# Patient Record
Sex: MALE | Race: WHITE | Employment: OTHER | URBAN - METROPOLITAN AREA
[De-identification: names, ages, dates, MRNs, and addresses within clinical notes are randomized per-mention and may not be internally consistent; named-entity substitution may affect disease eponyms.]

---

## 2017-11-01 RX ORDER — SIMVASTATIN 40 MG
TABLET ORAL
Qty: 30 TABLET | Refills: 0 | Status: SHIPPED | OUTPATIENT
Start: 2017-11-01 | End: 2017-12-01 | Stop reason: SDUPTHER

## 2017-11-09 RX ORDER — LISINOPRIL 5 MG/1
TABLET ORAL
Qty: 90 TABLET | Refills: 3 | Status: SHIPPED | OUTPATIENT
Start: 2017-11-09 | End: 2018-11-26 | Stop reason: SDUPTHER

## 2017-12-01 RX ORDER — SIMVASTATIN 40 MG
TABLET ORAL
Qty: 30 TABLET | Refills: 5 | Status: SHIPPED | OUTPATIENT
Start: 2017-12-01 | End: 2018-06-06 | Stop reason: SDUPTHER

## 2018-01-18 ENCOUNTER — OFFICE VISIT (OUTPATIENT)
Dept: CARDIOLOGY CLINIC | Age: 68
End: 2018-01-18

## 2018-01-18 VITALS
HEART RATE: 86 BPM | OXYGEN SATURATION: 98 % | SYSTOLIC BLOOD PRESSURE: 130 MMHG | BODY MASS INDEX: 40.84 KG/M2 | HEIGHT: 72 IN | DIASTOLIC BLOOD PRESSURE: 86 MMHG | WEIGHT: 301.5 LBS

## 2018-01-18 DIAGNOSIS — E78.00 PURE HYPERCHOLESTEROLEMIA: ICD-10-CM

## 2018-01-18 DIAGNOSIS — I10 ESSENTIAL HYPERTENSION: ICD-10-CM

## 2018-01-18 DIAGNOSIS — I25.10 CORONARY ARTERY DISEASE INVOLVING NATIVE CORONARY ARTERY OF NATIVE HEART WITHOUT ANGINA PECTORIS: Primary | ICD-10-CM

## 2018-01-18 PROCEDURE — 99214 OFFICE O/P EST MOD 30 MIN: CPT | Performed by: INTERNAL MEDICINE

## 2018-01-18 PROCEDURE — 1123F ACP DISCUSS/DSCN MKR DOCD: CPT | Performed by: INTERNAL MEDICINE

## 2018-01-18 PROCEDURE — G8484 FLU IMMUNIZE NO ADMIN: HCPCS | Performed by: INTERNAL MEDICINE

## 2018-01-18 PROCEDURE — G8417 CALC BMI ABV UP PARAM F/U: HCPCS | Performed by: INTERNAL MEDICINE

## 2018-01-18 PROCEDURE — 4040F PNEUMOC VAC/ADMIN/RCVD: CPT | Performed by: INTERNAL MEDICINE

## 2018-01-18 PROCEDURE — 1036F TOBACCO NON-USER: CPT | Performed by: INTERNAL MEDICINE

## 2018-01-18 PROCEDURE — 3017F COLORECTAL CA SCREEN DOC REV: CPT | Performed by: INTERNAL MEDICINE

## 2018-01-18 PROCEDURE — G8598 ASA/ANTIPLAT THER USED: HCPCS | Performed by: INTERNAL MEDICINE

## 2018-01-18 PROCEDURE — G8427 DOCREV CUR MEDS BY ELIG CLIN: HCPCS | Performed by: INTERNAL MEDICINE

## 2018-01-18 ASSESSMENT — ENCOUNTER SYMPTOMS
SHORTNESS OF BREATH: 0
COUGH: 0
BLOOD IN STOOL: 0
EYE REDNESS: 0
ABDOMINAL DISTENTION: 0
WHEEZING: 0

## 2018-01-18 NOTE — PATIENT INSTRUCTIONS
Patient Education        Learning About Coronary Artery Disease (CAD)  What is coronary artery disease? Coronary artery disease (CAD) occurs when plaque builds up in the arteries that bring oxygen-rich blood to your heart. Plaque is a fatty substance made of cholesterol, calcium, and other substances in the blood. This process is called hardening of the arteries, or atherosclerosis. What happens when you have coronary artery disease? · Plaque may narrow the coronary arteries. Narrowed arteries cause poor blood flow. This can lead to angina symptoms such as chest pain or discomfort. If blood flow is completely blocked, you could have a heart attack. · You can slow CAD and reduce the risk of future problems by making changes in your lifestyle. These include quitting smoking and eating heart-healthy foods. · Treatments for CAD, along with changes in your lifestyle, can help you live a longer and healthier life. How can you prevent coronary artery disease? · Do not smoke. It may be the best thing you can do to prevent heart disease. If you need help quitting, talk to your doctor about stop-smoking programs and medicines. These can increase your chances of quitting for good. · Be active. Get at least 30 minutes of exercise on most days of the week. Walking is a good choice. You also may want to do other activities, such as running, swimming, cycling, or playing tennis or team sports. · Eat heart-healthy foods. Eat more fruits and vegetables and less foods that contain saturated and trans fats. Limit alcohol, sodium, and sweets. · Stay at a healthy weight. Lose weight if you need to. · Manage other health problems such as diabetes, high blood pressure, and high cholesterol. · Manage stress. Stress can hurt your heart. To keep stress low, talk about your problems and feelings. Don't keep your feelings hidden. · If you have talked about it with your doctor, take a low-dose aspirin every day.  Aspirin can help certain people lower their risk of a heart attack or stroke. But taking aspirin isn't right for everyone, because it can cause serious bleeding. Do not start taking daily aspirin unless your doctor knows about it. How is coronary artery disease treated? · Your doctor will suggest that you make lifestyle changes. For example, your doctor may ask you to eat healthy foods, quit smoking, lose extra weight, and be more active. · You will have to take medicines. · Your doctor may suggest a procedure to open narrowed or blocked arteries. This is called angioplasty. Or your doctor may suggest using healthy blood vessels to create detours around narrowed or blocked arteries. This is called bypass surgery. Follow-up care is a key part of your treatment and safety. Be sure to make and go to all appointments, and call your doctor if you are having problems. It's also a good idea to know your test results and keep a list of the medicines you take. Where can you learn more? Go to https://Finco.Sh[employer] Easy. org and sign in to your [employer] account. Enter ([phone] [phone] in the [employer] box to learn more about \"Learning About Coronary Artery Disease (CAD). \"     If you do not have an account, please click on the \"Sign Up Now\" link. Current as of: September 21, 2016  Content Version: 11.5  © 4896-0456 Healthwise, Incorporated. Care instructions adapted under license by [facility] ([facility]). If you have questions about a medical condition or this instruction, always ask your healthcare professional. [place] [address] any warranty or liability for your use of this information.        Patient Education        Patient Education

## 2018-06-06 RX ORDER — SIMVASTATIN 40 MG
TABLET ORAL
Qty: 30 TABLET | Refills: 5 | Status: SHIPPED | OUTPATIENT
Start: 2018-06-06 | End: 2018-11-26 | Stop reason: SDUPTHER

## 2018-11-26 RX ORDER — LISINOPRIL 5 MG/1
TABLET ORAL
Qty: 90 TABLET | Refills: 3 | Status: SHIPPED | OUTPATIENT
Start: 2018-11-26 | End: 2020-05-04 | Stop reason: ALTCHOICE

## 2018-11-26 RX ORDER — SIMVASTATIN 40 MG
TABLET ORAL
Qty: 90 TABLET | Refills: 3 | Status: SHIPPED | OUTPATIENT
Start: 2018-11-26 | End: 2019-12-16 | Stop reason: SDUPTHER

## 2019-12-16 RX ORDER — SIMVASTATIN 40 MG
TABLET ORAL
Qty: 30 TABLET | Refills: 0 | Status: SHIPPED | OUTPATIENT
Start: 2019-12-16 | End: 2020-01-23 | Stop reason: SDUPTHER

## 2020-01-23 RX ORDER — SIMVASTATIN 40 MG
TABLET ORAL
Qty: 30 TABLET | Refills: 0 | Status: SHIPPED | OUTPATIENT
Start: 2020-01-23 | End: 2020-02-03 | Stop reason: SDUPTHER

## 2020-02-03 RX ORDER — SIMVASTATIN 40 MG
TABLET ORAL
Qty: 90 TABLET | Refills: 3 | Status: SHIPPED | OUTPATIENT
Start: 2020-02-03

## 2020-05-04 RX ORDER — TAMSULOSIN HYDROCHLORIDE 0.4 MG/1
0.4 CAPSULE ORAL DAILY
COMMUNITY

## 2020-05-04 RX ORDER — LISINOPRIL AND HYDROCHLOROTHIAZIDE 20; 12.5 MG/1; MG/1
0.5 TABLET ORAL 2 TIMES DAILY
COMMUNITY

## 2020-05-05 ENCOUNTER — OFFICE VISIT (OUTPATIENT)
Dept: CARDIOLOGY CLINIC | Age: 70
End: 2020-05-05
Payer: MEDICARE

## 2020-05-05 VITALS
HEIGHT: 72 IN | WEIGHT: 309 LBS | OXYGEN SATURATION: 97 % | HEART RATE: 62 BPM | SYSTOLIC BLOOD PRESSURE: 128 MMHG | BODY MASS INDEX: 41.85 KG/M2 | TEMPERATURE: 97.9 F | DIASTOLIC BLOOD PRESSURE: 82 MMHG

## 2020-05-05 PROCEDURE — 93000 ELECTROCARDIOGRAM COMPLETE: CPT | Performed by: INTERNAL MEDICINE

## 2020-05-05 PROCEDURE — 99214 OFFICE O/P EST MOD 30 MIN: CPT | Performed by: INTERNAL MEDICINE

## 2020-05-05 ASSESSMENT — ENCOUNTER SYMPTOMS
EYE REDNESS: 0
WHEEZING: 0
COUGH: 0
SHORTNESS OF BREATH: 1
BLOOD IN STOOL: 0
ABDOMINAL DISTENTION: 0

## 2020-05-05 NOTE — PROGRESS NOTES
Cardiology Follow up    Reason for visit: Follow up for management of CAD       Chief Complaint   Patient presents with    1 Year Follow Up     CAD, HTN, HLD, COPD    Shortness of Breath     thinks due to his weight       HPI: Arley Salas is a 81 y.o. male with a past medical history significant for CAD s/p CABG X 3, hypertension and hyperlipidemia. Interval History: Today, he presents to office for follow up for management of CAD. He reports feeling short of breath which he attributes to his weight but states he has being trying to loose weight and recently lost 20 lbs. He state the shortness of breath comes and goes. He reports the he followed up with sleep study and has a CPAP but is not wearing daily but plans to restart. He is compliant with his medications and tolerating them well. He denies chest pain/pressure, tightness, edema, heart racing, palpitations, lightheadedness, dizziness, syncope, presyncope,  PND or orthopnea. Current Outpatient Medications   Medication Sig Dispense Refill    lisinopril-hydroCHLOROthiazide (PRINZIDE;ZESTORETIC) 20-12.5 MG per tablet Take 0.5 tablets by mouth 2 times daily      tamsulosin (FLOMAX) 0.4 MG capsule Take 0.4 mg by mouth daily      Esomeprazole Magnesium (NEXIUM PO) Take by mouth daily      Naproxen Sodium (ALEVE PO) Take by mouth as needed      metoprolol tartrate (LOPRESSOR) 25 MG tablet TAKE 1/2 TABLET BY MOUTH TWICE A  tablet 3    simvastatin (ZOCOR) 40 MG tablet TAKE 1 TABLET BY MOUTH IN THE EVENING 90 tablet 3    aspirin 325 MG tablet Take 650 mg by mouth daily. In hospital medication       No current facility-administered medications for this visit.       Social History     Socioeconomic History    Marital status:      Spouse name: None    Number of children: None    Years of education: None    Highest education level: None   Occupational History    None   Social Needs    Financial resource strain: None   Taylor-[place]arden[place] insecurity     Worry: None     Inability: None    Transportation needs     Medical: None     Non-medical: None   Tobacco Use    Smoking status: Former Smoker     Packs/day: 2.00     Years: 25.00     Pack years: 50.00     Last attempt to quit: 2012     Years since quittin.3    Smokeless tobacco: Never Used   Substance and Sexual Activity    Alcohol use: No     Comment: Former ETOH Abuse Quit 30 years ago    Drug use: No    Sexual activity: None   Lifestyle    Physical activity     Days per week: None     Minutes per session: None    Stress: None   Relationships    Social connections     Talks on phone: None     Gets together: None     Attends Religion service: None     Active member of club or organization: None     Attends meetings of clubs or organizations: None     Relationship status: None    Intimate partner violence     Fear of current or ex partner: None     Emotionally abused: None     Physically abused: None     Forced sexual activity: None   Other Topics Concern    None   Social History Narrative    None     Past Surgical History:   Procedure Laterality Date    CORONARY ARTERY BYPASS GRAFT  2012    KNEE SURGERY       No Known Allergies  Family History   Problem Relation Age of Onset    Heart Disease Father     High Blood Pressure Father     High Cholesterol Father     Dementia Mother     Heart Disease Paternal Grandfather     Anesth Problems Neg Hx     Malig Hyperten Neg Hx     Hypotension Neg Hx     Malig Hypertherm Neg Hx     Pseudochol. Deficiency Neg Hx        Review of Systems   Constitutional: Negative for activity change, appetite change, chills, fatigue, fever and unexpected weight change. HENT: Negative for congestion, nosebleeds and tinnitus. Eyes: Negative for redness and visual disturbance. Respiratory: Positive for shortness of breath. Negative for cough and wheezing. Cardiovascular: Negative for chest pain, palpitations and leg swelling.

## 2022-08-26 PROBLEM — I48.0 PAF (PAROXYSMAL ATRIAL FIBRILLATION) (HCC): Status: ACTIVE | Noted: 2022-08-26

## 2022-08-26 NOTE — PROGRESS NOTES
[facility]   Electrophysiology Consultation   Date: 8/29/2022  Reason for Consultation: PAF   Consult Requesting Physician: Soo Hansen   Chief Complaint   Patient presents with    New Patient    Atrial Fibrillation    Hypertension         CC: Afib   HPI: Iggy Villanueva is a 73 y.o. male with a PMH of CAD, HTN, HLD, COPD, NASIMA and atrial fibrillation    8/9/2022 he was seen by  at [facility] for 6 month follow up. He had complaints of shortness of breath and fatigue. Advised to complete holter and stress test and referred to EP for treatment options. Ashley Han presents to the office as a new patient. He states he has been in afib for about the past year. He follows Gop[facility] Allen but does not prefer to be seen at [facility]. He is tired all the time, occasionally can fall asleep sitting up. He has a cpap at home but does not use it as he was unable to tolerate it in the past. He feels his heart racing at times. Denies passing out or blacking out. He works physically and is about to build an addition on his home. He has noticed a decrease in stamina recently and increase in LOZADA. Assessment and plan:   -Persistent Atrial Fibrillation (per pt has been out of rhythm since last year)   ECG today shows Afib   Patient has a [id number] Score of at least 3 (HTN, Age)CAD) on eliquis 5 mg BID    Continue Toprol XL 50 mg daily and Diltiazem 120 mg daily for rate control  We discussed treatment options including antiarrhythmics, rate control with anticoagulation, and ablation. We discussed progressive nature of atrial fibrillation. Treatment success decreases when AF becomes persistent and last more than 6 months. Antiarrhythmic therapy, side effects, benefits and alternative discussed. He is not interested in antiarrhythmic therapy with amiodarone, etc.      Atrial fibrillation ablation procedure was discussed. We discussed the need for repeat procedure.  On average patients may need more than one ablation procedure. Risks associated with ablation include but not limited to allergic reaction to the medications, pain, bleeding, infection, nerve injury, injury to diaphragm(breathing muscle), pulmonary embolus(blood clot in lungs), deep vein blood clot, pneumothorax, hemothorax, acute renal failure, cardiac perforation,  tamponade, need for emergent surgery (open heart), permanent pacemaker, pulmonary vein stenosis, left atrial to esophageal fistula, stroke, myocardial infarction and death. Difference between atrial fibrillation and atrial flutter discussed and treatment discussed. Schedule ablation of atrial fibrillation    -CAD   S/P CABG X3 1/17/12 LIMA-LAD, SVG-OM2 and PDA/LCx,   Managed by     On BB, ASA and Zocor    -HTN  Controlled: 115/80  BP goal <130/80  Home BP monitoring encouraged, printed information provided on how to accurately measure BP at home. Counseled to follow a low salt diet to assure blood pressure remains controlled for cardiovascular risk factor modification. The patient is counseled to get regular exercise 3-5 times per week and maintain a healthy weight reduce cardiovascular risk factors. -NASIMA  Does not use his CPAP, encouraged him to use the machine   Encourage to use machine to prevent long term effects of untreated NASIMA    -Obesity  Body mass index is 41.45 kg/m². Excessive weight is complicating assessment and treatment. It is placing patient at risk for multiple co-morbidities as well as early death and contributing to the patient's presentation.   Discussed weight management with diet and exercise        Patient Active Problem List    Diagnosis Date Noted    PAF [facility]oxysmal [facility] fibrillation[facility] 08/26/2022    CAD (coronary artery disease) 01/11/2012    HTN (hypertension) 01/11/2012    HLD (hyperlipidemia) 01/11/2012    Asthma 01/11/2012    Obesity 01/11/2012     Diagnostic studies:   ECG 8/29/22  Atrial fibrillation     Echo 9/22/2021  CONCLUSIONS The left ventricle is of normal size with no segmental wall motion abnormalities. Left ventricular function is normal.   The ejection fraction is 55-60%. Structurally normal tricuspid valve without significant stenosis. There is mild-moderate tricuspid regurgitation. Pulmonary artery systolic pressure is normal.  Structurally normal mitral valve without significant stenosis or prolapse. There is mild mitral regurgitation. I independently reviewed the cardiac diagnostic studies, ECG and relevant imaging studies. No results found for: LVEF, LVEFMODE  No results found for: TSHFT4, TSH    Physical Examination:  Vitals:    08/29/22 1358   BP: 115/80   Pulse: 79   SpO2: 96%      Wt Readings from Last 3 Encounters:   08/29/22 (!) 305 lb 9.6 oz (138.6 kg)   05/04/20 (!) 309 lb (140.2 kg)   01/18/18 (!) 301 lb 8 oz (136.8 kg)       Constitutional: Oriented. No distress. Head: Normocephalic and atraumatic. Mouth/Throat: Oropharynx is clear and moist.   Eyes: Conjunctivae normal. EOM are normal.   Neck: Neck supple. No JVD present. Cardiovascular: Normal rate, Irregular rhythm, S1&S2. Pulmonary/Chest: Bilateral respiratory sounds. No rhonchi. Abdominal: Soft. No tenderness. + obese   Musculoskeletal: No tenderness. No edema    Lymphadenopathy: Has no cervical adenopathy. Neurological: Alert and oriented. Follows command, No Gross deficit   Skin: Skin is warm, No rash noted. Psychiatric: Has a normal behavior        Review of System:  [x] Full ROS obtained and negative except as mentioned in HPI    Prior to Admission medications    Medication Sig Start Date End Date Taking?  Authorizing Provider   lisinopril-hydroCHLOROthiazide (PRINZIDE;ZESTORETIC) 20-12.5 MG per tablet Take 0.5 tablets by mouth 2 times daily   Yes Historical Provider, MD   tamsulosin (FLOMAX) 0.4 MG capsule Take 0.4 mg by mouth daily   Yes Historical Provider, MD   Esomeprazole Magnesium (NEXIUM PO) Take by mouth daily   Yes Historical Provider, MD   Naproxen Sodium (ALEVE PO) Take by mouth as needed   Yes Historical Provider, MD   metoprolol tartrate (LOPRESSOR) 25 MG tablet TAKE 1/2 TABLET BY MOUTH TWICE A DAY 3/11/20  Yes Keely Brumfield MD   simvastatin (ZOCOR) 40 MG tablet TAKE 1 TABLET BY MOUTH IN THE EVENING 2/3/20  Yes Keely Brumfield MD   aspirin 325 MG tablet Take 325 mg by mouth daily In hospital medication   Yes Historical Provider, MD       Past Medical History:   Diagnosis Date    CAD (coronary artery disease)     S/P CABG X3  1/17/12 LIMA-LAD, SVG-OM2 and PDA/LCx, LVEF 50-55%    COPD (chronic obstructive pulmonary disease) ([facility]     managed by PCP    Hyperlipidemia     rec semi annual lipids with LDL goal <70    Hypertension     controlled    Tobacco user     cessation discussed        Past Surgical History:   Procedure Laterality Date    CORONARY ARTERY BYPASS GRAFT  1/11/2012    KNEE SURGERY         No Known Allergies    Social History:  Reviewed. reports that he quit smoking about 10 years ago. He has a 50.00 pack-year smoking history. He has never used smokeless tobacco. He reports that he does not drink alcohol and does not use drugs. Family History:  Reviewed. Reviewed. No family history of SCD. Relevant and available labs, and cardiovascular diagnostics reviewed. Reviewed. I independently reviewed all cardiac tracing. I independently reviewed relevant and available cardiac diagnostic tests ECG, CXR, Echo, Stress test, Device interrogation, Holter, CT scan. Outside medical records via Care everywhere reviewed and summarized in H&P above.    Complex medical condition with multiple medical problems affecting prognosis and outcome of EP interventions       - The patient is counseled to follow a low salt diet to assure blood pressure remains controlled for cardiovascular risk factor modification.   - The patient is counseled to avoid excess caffeine, and energy drinks as this may exacerbated ectopy and arrhythmia. - The patient is counseled to get regular exercise 3-5 times per week to control cardiovascular risk factors. - The patient is counseled to lose weigt to control cardiovascular risk factors. All questions and concerns were addressed to the patient/family. Alternatives to my treatment were discussed. I have discussed the above stated plan and the patient verbalized understanding and agreed with the plan. Scribe attestation: This note was scribed in the presence of Pérez Reed MD by Karen Roper RN    Physician Attestation: I, Dr. Pérez Reed, confirm that the scribe's documentation has been prepared under my direction and personally reviewed by me in its entirety. I also confirm that the note above accurately reflects all work, treatment, procedures, and medical decision making performed by me. NOTE: This report was transcribed using voice recognition software. Every effort was made to ensure accuracy, however, inadvertent computerized transcription errors may be present.      Pérez Reed MD, MPH  [facility]   Office: [phone]  Fax: [phone]

## 2022-08-29 ENCOUNTER — OFFICE VISIT (OUTPATIENT)
Dept: CARDIOLOGY CLINIC | Age: 72
End: 2022-08-29
Payer: MEDICARE

## 2022-08-29 VITALS
WEIGHT: 305.6 LBS | SYSTOLIC BLOOD PRESSURE: 115 MMHG | DIASTOLIC BLOOD PRESSURE: 80 MMHG | HEIGHT: 72 IN | HEART RATE: 79 BPM | OXYGEN SATURATION: 96 % | BODY MASS INDEX: 41.39 KG/M2

## 2022-08-29 DIAGNOSIS — I25.810 CORONARY ARTERY DISEASE INVOLVING CORONARY BYPASS GRAFT OF NATIVE HEART WITHOUT ANGINA PECTORIS: ICD-10-CM

## 2022-08-29 DIAGNOSIS — I10 PRIMARY HYPERTENSION: ICD-10-CM

## 2022-08-29 DIAGNOSIS — I48.0 PAF (PAROXYSMAL ATRIAL FIBRILLATION) (HCC): Primary | ICD-10-CM

## 2022-08-29 DIAGNOSIS — E66.01 CLASS 3 SEVERE OBESITY DUE TO EXCESS CALORIES WITHOUT SERIOUS COMORBIDITY IN ADULT, UNSPECIFIED BMI (HCC): ICD-10-CM

## 2022-08-29 PROCEDURE — G8417 CALC BMI ABV UP PARAM F/U: HCPCS | Performed by: INTERNAL MEDICINE

## 2022-08-29 PROCEDURE — 3017F COLORECTAL CA SCREEN DOC REV: CPT | Performed by: INTERNAL MEDICINE

## 2022-08-29 PROCEDURE — 93000 ELECTROCARDIOGRAM COMPLETE: CPT | Performed by: INTERNAL MEDICINE

## 2022-08-29 PROCEDURE — 99205 OFFICE O/P NEW HI 60 MIN: CPT | Performed by: INTERNAL MEDICINE

## 2022-08-29 PROCEDURE — 4004F PT TOBACCO SCREEN RCVD TLK: CPT | Performed by: INTERNAL MEDICINE

## 2022-08-29 PROCEDURE — G8427 DOCREV CUR MEDS BY ELIG CLIN: HCPCS | Performed by: INTERNAL MEDICINE

## 2022-08-29 PROCEDURE — 1123F ACP DISCUSS/DSCN MKR DOCD: CPT | Performed by: INTERNAL MEDICINE

## 2022-08-29 NOTE — PATIENT INSTRUCTIONS
ATRIAL FIB ABLATION INFORMATION    Our  will be contacting you within the next week to schedule     The morning of your ablation you will need to check in at the registration desk in the main lobby. PRE-PROCEDURE INSTRUCTIONS -   -You will be called with a time to arrive for you ablation.  -Do not eat or drink anything after midnight the night before your ablation.  -You may take all of your other medications with a sip of water.  -You will need to have a responsible adult to drive you home. -CVU will provide you with discharge instructions.  -You will need to hold your Eliquis for the day before and morning of     You will be scheduled for a 6-8 week follow up with cardiology. This will be done prior to your discharge instructions. If you have any questions regarding the procedure itself or medications, please call [phone] and ask to speak to an EP nurse.

## 2022-08-29 NOTE — LETTER
[address]  EP Procedure Sheet    8/29/22  Deanne Bryce  1950  EP Procedures  [] Pacemaker implant (single/dual) [] EP Study   [] ICD implant (single/dual) [] Atrial flutter ablation ([facility]NA Y/N)   [] Biv implant ICD [] Tilt Table   [] Biv implant PPM [x] Atrial fibrillation ablation ([facility] Yes)   [] Generator Change (PPM/ICD/BiV) [] SVT ablation   [] Lead revision (RV/LA/RA) (<1 month) [] PVC ablation     [] Lead extraction +/- upgrade (BiV/PPM/ICD) [] VT Ischemic/ non-ischemic   [] Loop implant/ removal [] VT RVOT   [] Cardioversion [] VT Left sided   [] AMNA [] AVN ablation   Equipment  [] Redux  [] JONATHAN Mapping System   [] [address] [place] [x] [address]   [] [employer] [] CryoAblation   [] Biotronik [] Laser Lead Extraction   EP Procedures Scheduling Request  # hours Requested  []1 []2 [x]4 [] 4-6 Scheduled  Date:   Specific Day  Completed    Anesthesia [x]yes []no F/u Date:   CT surgery backup []yes [x]no COVID     Overnight stay      Performing MD  [x]RMM []MXA   []MKW [] Other _______ First vs repeat   [x]1st [] 2nd [] 3rd   Pre-Procedure Labs / Imaging  [] PT/INR [] Type & cross   [] CBC [] Units PRBC   [] BMP/Mg [] Units FFP   [] Venogram [] Cardiac CTA for Pulmonary vein mapping     RN INITIALS: RA    Patient Instructions  Do not eat or drink after midnight the night prior to procedure  Dx:Persistent AF ICD-10 code: I48.19  Medication Instructions: Hold []Xarelto [x]Eliquis []Coumadin []pradaxa for 1 day/s prior

## 2022-11-11 ENCOUNTER — HOSPITAL ENCOUNTER (OUTPATIENT)
Dept: CARDIAC CATH/INVASIVE PROCEDURES | Age: 72
Discharge: HOME | End: 2022-11-11
Attending: INTERNAL MEDICINE | Admitting: INTERNAL MEDICINE
Payer: MEDICARE

## 2022-11-11 ENCOUNTER — ANESTHESIA EVENT (OUTPATIENT)
Dept: CARDIAC CATH/INVASIVE PROCEDURES | Age: 72
End: 2022-11-11
Payer: MEDICARE

## 2022-11-11 ENCOUNTER — ANESTHESIA (OUTPATIENT)
Dept: CARDIAC CATH/INVASIVE PROCEDURES | Age: 72
End: 2022-11-11
Payer: MEDICARE

## 2022-11-11 VITALS
OXYGEN SATURATION: 96 % | BODY MASS INDEX: 41.31 KG/M2 | TEMPERATURE: 97.2 F | HEIGHT: 72 IN | HEART RATE: 64 BPM | RESPIRATION RATE: 14 BRPM | DIASTOLIC BLOOD PRESSURE: 66 MMHG | WEIGHT: 305 LBS | SYSTOLIC BLOOD PRESSURE: 105 MMHG

## 2022-11-11 PROBLEM — I48.19 PERSISTENT ATRIAL FIBRILLATION (HCC): Status: ACTIVE | Noted: 2022-11-11

## 2022-11-11 LAB
ABO/RH: NORMAL
ANTIBODY SCREEN: NORMAL
EKG ATRIAL RATE: 67 BPM
EKG DIAGNOSIS: NORMAL
EKG Q-T INTERVAL: 392 MS
EKG QRS DURATION: 94 MS
EKG QTC CALCULATION (BAZETT): 457 MS
EKG R AXIS: -12 DEGREES
EKG T AXIS: 56 DEGREES
EKG VENTRICULAR RATE: 82 BPM
HCT VFR BLD CALC: 40 % (ref 40.5–52.5)
HEMOGLOBIN: 13.2 G/DL (ref 13.5–17.5)
LV EF: 55 %
LVEF MODALITY: NORMAL
MCH RBC QN AUTO: 30.9 PG (ref 26–34)
MCHC RBC AUTO-ENTMCNC: 33.1 G/DL (ref 31–36)
MCV RBC AUTO: 93.3 FL (ref 80–100)
PDW BLD-RTO: 13.5 % (ref 12.4–15.4)
PLATELET # BLD: 227 K/UL (ref 135–450)
PMV BLD AUTO: 7.6 FL (ref 5–10.5)
POC ACT LR: 316 SEC
POC ACT LR: 355 SEC
POC ACT LR: 360 SEC
POC ACT LR: 389 SEC
RBC # BLD: 4.28 M/UL (ref 4.2–5.9)
WBC # BLD: 6.3 K/UL (ref 4–11)

## 2022-11-11 PROCEDURE — 93656 COMPRE EP EVAL ABLTJ ATR FIB: CPT

## 2022-11-11 PROCEDURE — 2500000003 HC RX 250 WO HCPCS

## 2022-11-11 PROCEDURE — 93622 COMP EP EVAL L VENTR PAC&REC: CPT | Performed by: INTERNAL MEDICINE

## 2022-11-11 PROCEDURE — 7100000001 HC PACU RECOVERY - ADDTL 15 MIN

## 2022-11-11 PROCEDURE — C1760 CLOSURE DEV, VASC: HCPCS

## 2022-11-11 PROCEDURE — 86850 RBC ANTIBODY SCREEN: CPT

## 2022-11-11 PROCEDURE — 93005 ELECTROCARDIOGRAM TRACING: CPT | Performed by: INTERNAL MEDICINE

## 2022-11-11 PROCEDURE — 6360000002 HC RX W HCPCS: Performed by: NURSE ANESTHETIST, CERTIFIED REGISTERED

## 2022-11-11 PROCEDURE — 93623 PRGRMD STIMJ&PACG IV RX NFS: CPT | Performed by: INTERNAL MEDICINE

## 2022-11-11 PROCEDURE — 3700000001 HC ADD 15 MINUTES (ANESTHESIA)

## 2022-11-11 PROCEDURE — 7100000000 HC PACU RECOVERY - FIRST 15 MIN

## 2022-11-11 PROCEDURE — C1759 CATH, INTRA ECHOCARDIOGRAPHY: HCPCS

## 2022-11-11 PROCEDURE — 93657 TX L/R ATRIAL FIB ADDL: CPT

## 2022-11-11 PROCEDURE — 86900 BLOOD TYPING SEROLOGIC ABO: CPT

## 2022-11-11 PROCEDURE — 93325 DOPPLER ECHO COLOR FLOW MAPG: CPT

## 2022-11-11 PROCEDURE — 85347 COAGULATION TIME ACTIVATED: CPT

## 2022-11-11 PROCEDURE — 85027 COMPLETE CBC AUTOMATED: CPT

## 2022-11-11 PROCEDURE — 2580000003 HC RX 258

## 2022-11-11 PROCEDURE — 86901 BLOOD TYPING SEROLOGIC RH(D): CPT

## 2022-11-11 PROCEDURE — 93656 COMPRE EP EVAL ABLTJ ATR FIB: CPT | Performed by: INTERNAL MEDICINE

## 2022-11-11 PROCEDURE — 2580000003 HC RX 258: Performed by: NURSE ANESTHETIST, CERTIFIED REGISTERED

## 2022-11-11 PROCEDURE — 80053 COMPREHEN METABOLIC PANEL: CPT

## 2022-11-11 PROCEDURE — 93320 DOPPLER ECHO COMPLETE: CPT

## 2022-11-11 PROCEDURE — 83735 ASSAY OF MAGNESIUM: CPT

## 2022-11-11 PROCEDURE — C1766 INTRO/SHEATH,STRBLE,NON-PEEL: HCPCS

## 2022-11-11 PROCEDURE — 93312 ECHO TRANSESOPHAGEAL: CPT

## 2022-11-11 PROCEDURE — C1732 CATH, EP, DIAG/ABL, 3D/VECT: HCPCS

## 2022-11-11 PROCEDURE — 2500000003 HC RX 250 WO HCPCS: Performed by: NURSE ANESTHETIST, CERTIFIED REGISTERED

## 2022-11-11 PROCEDURE — 93010 ELECTROCARDIOGRAM REPORT: CPT | Performed by: INTERNAL MEDICINE

## 2022-11-11 PROCEDURE — 36415 COLL VENOUS BLD VENIPUNCTURE: CPT

## 2022-11-11 PROCEDURE — 93657 TX L/R ATRIAL FIB ADDL: CPT | Performed by: INTERNAL MEDICINE

## 2022-11-11 PROCEDURE — 93623 PRGRMD STIMJ&PACG IV RX NFS: CPT

## 2022-11-11 PROCEDURE — C1769 GUIDE WIRE: HCPCS

## 2022-11-11 PROCEDURE — C1894 INTRO/SHEATH, NON-LASER: HCPCS

## 2022-11-11 PROCEDURE — 3700000000 HC ANESTHESIA ATTENDED CARE

## 2022-11-11 PROCEDURE — 6360000002 HC RX W HCPCS

## 2022-11-11 RX ORDER — LIDOCAINE HYDROCHLORIDE 20 MG/ML
INJECTION, SOLUTION EPIDURAL; INFILTRATION; INTRACAUDAL; PERINEURAL PRN
Status: DISCONTINUED | OUTPATIENT
Start: 2022-11-11 | End: 2022-11-11 | Stop reason: SDUPTHER

## 2022-11-11 RX ORDER — ONDANSETRON 2 MG/ML
INJECTION INTRAMUSCULAR; INTRAVENOUS PRN
Status: DISCONTINUED | OUTPATIENT
Start: 2022-11-11 | End: 2022-11-11 | Stop reason: SDUPTHER

## 2022-11-11 RX ORDER — ONDANSETRON 2 MG/ML
4 INJECTION INTRAMUSCULAR; INTRAVENOUS
OUTPATIENT
Start: 2022-11-11 | End: 2022-11-12

## 2022-11-11 RX ORDER — HYDROMORPHONE HCL 110MG/55ML
0.25 PATIENT CONTROLLED ANALGESIA SYRINGE INTRAVENOUS EVERY 5 MIN PRN
OUTPATIENT
Start: 2022-11-11

## 2022-11-11 RX ORDER — SUCCINYLCHOLINE/SOD CL,ISO/PF 200MG/10ML
SYRINGE (ML) INTRAVENOUS PRN
Status: DISCONTINUED | OUTPATIENT
Start: 2022-11-11 | End: 2022-11-11 | Stop reason: SDUPTHER

## 2022-11-11 RX ORDER — HYDROMORPHONE HCL 110MG/55ML
0.5 PATIENT CONTROLLED ANALGESIA SYRINGE INTRAVENOUS EVERY 5 MIN PRN
OUTPATIENT
Start: 2022-11-11

## 2022-11-11 RX ORDER — DILTIAZEM HYDROCHLORIDE 120 MG/1
120 CAPSULE, COATED, EXTENDED RELEASE ORAL DAILY
COMMUNITY

## 2022-11-11 RX ORDER — VECURONIUM BROMIDE 1 MG/ML
INJECTION, POWDER, LYOPHILIZED, FOR SOLUTION INTRAVENOUS PRN
Status: DISCONTINUED | OUTPATIENT
Start: 2022-11-11 | End: 2022-11-11 | Stop reason: SDUPTHER

## 2022-11-11 RX ORDER — SODIUM CHLORIDE 9 MG/ML
INJECTION, SOLUTION INTRAVENOUS CONTINUOUS PRN
Status: DISCONTINUED | OUTPATIENT
Start: 2022-11-11 | End: 2022-11-11 | Stop reason: SDUPTHER

## 2022-11-11 RX ORDER — PROTAMINE SULFATE 10 MG/ML
INJECTION, SOLUTION INTRAVENOUS PRN
Status: DISCONTINUED | OUTPATIENT
Start: 2022-11-11 | End: 2022-11-11 | Stop reason: SDUPTHER

## 2022-11-11 RX ORDER — MEPERIDINE HYDROCHLORIDE 25 MG/ML
12.5 INJECTION INTRAMUSCULAR; INTRAVENOUS; SUBCUTANEOUS EVERY 5 MIN PRN
OUTPATIENT
Start: 2022-11-11

## 2022-11-11 RX ORDER — FENTANYL CITRATE 50 UG/ML
INJECTION, SOLUTION INTRAMUSCULAR; INTRAVENOUS PRN
Status: DISCONTINUED | OUTPATIENT
Start: 2022-11-11 | End: 2022-11-11 | Stop reason: SDUPTHER

## 2022-11-11 RX ORDER — SODIUM CHLORIDE 0.9 % (FLUSH) 0.9 %
5-40 SYRINGE (ML) INJECTION PRN
Status: DISCONTINUED | OUTPATIENT
Start: 2022-11-11 | End: 2022-11-14 | Stop reason: HOSPADM

## 2022-11-11 RX ORDER — SODIUM CHLORIDE 0.9 % (FLUSH) 0.9 %
5-40 SYRINGE (ML) INJECTION PRN
OUTPATIENT
Start: 2022-11-11

## 2022-11-11 RX ORDER — SODIUM CHLORIDE 0.9 % (FLUSH) 0.9 %
5-40 SYRINGE (ML) INJECTION EVERY 12 HOURS SCHEDULED
OUTPATIENT
Start: 2022-11-11

## 2022-11-11 RX ORDER — DIPHENHYDRAMINE HYDROCHLORIDE 50 MG/ML
12.5 INJECTION INTRAMUSCULAR; INTRAVENOUS
OUTPATIENT
Start: 2022-11-11 | End: 2022-11-12

## 2022-11-11 RX ORDER — SODIUM CHLORIDE 9 MG/ML
INJECTION, SOLUTION INTRAVENOUS PRN
OUTPATIENT
Start: 2022-11-11

## 2022-11-11 RX ORDER — DEXAMETHASONE SODIUM PHOSPHATE 4 MG/ML
INJECTION, SOLUTION INTRA-ARTICULAR; INTRALESIONAL; INTRAMUSCULAR; INTRAVENOUS; SOFT TISSUE PRN
Status: DISCONTINUED | OUTPATIENT
Start: 2022-11-11 | End: 2022-11-11 | Stop reason: SDUPTHER

## 2022-11-11 RX ORDER — PROPOFOL 10 MG/ML
INJECTION, EMULSION INTRAVENOUS PRN
Status: DISCONTINUED | OUTPATIENT
Start: 2022-11-11 | End: 2022-11-11 | Stop reason: SDUPTHER

## 2022-11-11 RX ORDER — PANTOPRAZOLE SODIUM 40 MG/1
40 TABLET, DELAYED RELEASE ORAL DAILY
COMMUNITY

## 2022-11-11 RX ORDER — HEPARIN SODIUM 1000 [USP'U]/ML
INJECTION, SOLUTION INTRAVENOUS; SUBCUTANEOUS PRN
Status: DISCONTINUED | OUTPATIENT
Start: 2022-11-11 | End: 2022-11-11 | Stop reason: SDUPTHER

## 2022-11-11 RX ORDER — MIDAZOLAM HYDROCHLORIDE 1 MG/ML
INJECTION INTRAMUSCULAR; INTRAVENOUS PRN
Status: DISCONTINUED | OUTPATIENT
Start: 2022-11-11 | End: 2022-11-11 | Stop reason: SDUPTHER

## 2022-11-11 RX ADMIN — VECURONIUM BROMIDE 3 MG: 1 INJECTION, POWDER, LYOPHILIZED, FOR SOLUTION INTRAVENOUS at 11:30

## 2022-11-11 RX ADMIN — FENTANYL CITRATE 50 MCG: 50 INJECTION, SOLUTION INTRAMUSCULAR; INTRAVENOUS at 11:27

## 2022-11-11 RX ADMIN — HEPARIN SODIUM 7000 UNITS: 1000 INJECTION, SOLUTION INTRAVENOUS; SUBCUTANEOUS at 12:22

## 2022-11-11 RX ADMIN — DEXAMETHASONE SODIUM PHOSPHATE 10 MG: 4 INJECTION, SOLUTION INTRAMUSCULAR; INTRAVENOUS at 11:34

## 2022-11-11 RX ADMIN — PHENYLEPHRINE HYDROCHLORIDE 75 MCG/MIN: 10 INJECTION INTRAVENOUS at 11:35

## 2022-11-11 RX ADMIN — MIDAZOLAM 2 MG: 1 INJECTION INTRAMUSCULAR; INTRAVENOUS at 11:21

## 2022-11-11 RX ADMIN — SUGAMMADEX 200 MG: 100 INJECTION, SOLUTION INTRAVENOUS at 14:31

## 2022-11-11 RX ADMIN — ISOPROTERENOL HYDROCHLORIDE 5 MCG/MIN: 0.2 INJECTION, SOLUTION INTRAMUSCULAR; INTRAVENOUS at 14:11

## 2022-11-11 RX ADMIN — ONDANSETRON 4 MG: 2 INJECTION INTRAMUSCULAR; INTRAVENOUS at 11:34

## 2022-11-11 RX ADMIN — HEPARIN SODIUM 7000 UNITS: 1000 INJECTION, SOLUTION INTRAVENOUS; SUBCUTANEOUS at 12:02

## 2022-11-11 RX ADMIN — HEPARIN SODIUM 3000 UNITS: 1000 INJECTION, SOLUTION INTRAVENOUS; SUBCUTANEOUS at 12:37

## 2022-11-11 RX ADMIN — PROTAMINE SULFATE 30 MG: 10 INJECTION, SOLUTION INTRAVENOUS at 14:27

## 2022-11-11 RX ADMIN — SODIUM CHLORIDE: 9 INJECTION, SOLUTION INTRAVENOUS at 09:28

## 2022-11-11 RX ADMIN — HEPARIN SODIUM 4000 UNITS: 1000 INJECTION, SOLUTION INTRAVENOUS; SUBCUTANEOUS at 13:04

## 2022-11-11 RX ADMIN — PROPOFOL 160 MG: 10 INJECTION, EMULSION INTRAVENOUS at 11:30

## 2022-11-11 RX ADMIN — FENTANYL CITRATE 50 MCG: 50 INJECTION, SOLUTION INTRAMUSCULAR; INTRAVENOUS at 14:30

## 2022-11-11 RX ADMIN — Medication 180 MG: at 11:30

## 2022-11-11 RX ADMIN — LIDOCAINE HYDROCHLORIDE 100 MG: 20 INJECTION, SOLUTION EPIDURAL; INFILTRATION; INTRACAUDAL; PERINEURAL at 11:28

## 2022-11-11 ASSESSMENT — COPD QUESTIONNAIRES: CAT_SEVERITY: MODERATE

## 2022-11-11 NOTE — PROGRESS NOTES
Pt admitted to PACU, drowsy but awakens to verbal commands, R groin with drsng CD&I, soft around site, + pedal pulses, NSR on tele, pt flat as ordered.  Vss- O2 saturations stable on simple mask 6L

## 2022-11-11 NOTE — ANESTHESIA POSTPROCEDURE EVALUATION
Department of Anesthesiology  Postprocedure Note    Patient: Crystal Osgood  MRN: [id number]  YOB: 1950  Date of evaluation: 11/11/2022      Procedure Summary     Date: 11/11/22 Room / Location: [facility] Cath Lab; [facility] Echocardiography    Anesthesia Start: 1120 Anesthesia Stop: 7586    Procedure: ECHO/ABLATION WITH ANESTHESIA Diagnosis: Other persistent atrial fibrillation    Scheduled Providers:  Responsible Provider: Roxie Christopher MD    Anesthesia Type: general ASA Status: 3          Anesthesia Type: No value filed.     Hope Phase I: Hoep Score: 8    Hope Phase II:        Anesthesia Post Evaluation    Patient location during evaluation: PACU  Patient participation: complete - patient participated  Level of consciousness: awake and alert  Pain score: 2  Airway patency: patent  Nausea & Vomiting: no vomiting  Complications: no  Cardiovascular status: blood pressure returned to baseline  Respiratory status: acceptable  Hydration status: euvolemic  Multimodal analgesia pain management approach

## 2022-11-11 NOTE — ANESTHESIA PRE PROCEDURE
Department of Anesthesiology  Preprocedure Note       Name:  Shelly Arenas   Age:  81 y.o.  :  1950                                          MRN:  [id number]         Date:  2022      Surgeon: * Surgery not found *    Procedure:     Medications prior to admission:   Prior to Admission medications    Medication Sig Start Date End Date Taking? Authorizing Provider   lisinopril-hydroCHLOROthiazide (PRINZIDE;ZESTORETIC) 20-12.5 MG per tablet Take 0.5 tablets by mouth 2 times daily    Historical Provider, MD   tamsulosin (FLOMAX) 0.4 MG capsule Take 0.4 mg by mouth daily    Historical Provider, MD   Esomeprazole Magnesium (NEXIUM PO) Take by mouth daily    Historical Provider, MD   Naproxen Sodium (ALEVE PO) Take by mouth as needed    Historical Provider, MD   metoprolol tartrate (LOPRESSOR) 25 MG tablet TAKE 1/2 TABLET BY MOUTH TWICE A DAY 3/11/20   Nhan Chappell MD   simvastatin (ZOCOR) 40 MG tablet TAKE 1 TABLET BY MOUTH IN THE EVENING 2/3/20   Nhan Chappell MD   aspirin 325 MG tablet Take 325 mg by mouth daily In hospital medication    Historical Provider, MD       Current medications:    No current facility-administered medications for this encounter.      Current Outpatient Medications   Medication Sig Dispense Refill    lisinopril-hydroCHLOROthiazide (PRINZIDE;ZESTORETIC) 20-12.5 MG per tablet Take 0.5 tablets by mouth 2 times daily      tamsulosin (FLOMAX) 0.4 MG capsule Take 0.4 mg by mouth daily      Esomeprazole Magnesium (NEXIUM PO) Take by mouth daily      Naproxen Sodium (ALEVE PO) Take by mouth as needed      metoprolol tartrate (LOPRESSOR) 25 MG tablet TAKE 1/2 TABLET BY MOUTH TWICE A  tablet 3    simvastatin (ZOCOR) 40 MG tablet TAKE 1 TABLET BY MOUTH IN THE EVENING 90 tablet 3    aspirin 325 MG tablet Take 325 mg by mouth daily In hospital medication         Allergies:  No Known Allergies    Problem List:    Patient Active Problem List   Diagnosis Code    CAD (coronary artery disease) I25.10    HTN (hypertension) I10    HLD (hyperlipidemia) E78.5    Asthma J45.909    Obesity E66.9    PAF (paroxysmal atrial fibrillation) (HCC) I48.0       Past Medical History:        Diagnosis Date    CAD (coronary artery disease)     S/P CABG X3  1/17/12 LIMA-LAD, SVG-OM2 and PDA/LCx, LVEF 50-55%    COPD (chronic obstructive pulmonary disease) ([facility])     managed by PCP    Hyperlipidemia     rec semi annual lipids with LDL goal <70    Hypertension     controlled    Tobacco user     cessation discussed       Past Surgical History:        Procedure Laterality Date    CORONARY ARTERY BYPASS GRAFT  1/11/2012    KNEE SURGERY         Social History:    Social History     Tobacco Use    Smoking status: Former     Packs/day: 2.00     Years: 25.00     Pack years: 50.00     Types: Cigarettes     Quit date: 1/12/2012     Years since quitting: 10.8    Smokeless tobacco: Never   Substance Use Topics    Alcohol use: No     Comment: Former ETOH Abuse Quit 30 years ago                                Counseling given: Not Answered      Vital Signs (Current): There were no vitals filed for this visit.                                            BP Readings from Last 3 Encounters:   08/29/22 115/80   05/04/20 128/82   01/18/18 130/86       NPO Status:                                                                                 BMI:   Wt Readings from Last 3 Encounters:   08/29/22 (!) 305 lb 9.6 oz (138.6 kg)   05/04/20 (!) 309 lb (140.2 kg)   01/18/18 (!) 301 lb 8 oz (136.8 kg)     There is no height or weight on file to calculate BMI.    CBC:   Lab Results   Component Value Date/Time    WBC 10.2 01/15/2012 05:45 AM    RBC 3.72 01/15/2012 05:45 AM    HGB 11.7 01/15/2012 05:45 AM    HCT 35.6 01/15/2012 05:45 AM    MCV 95.8 01/15/2012 05:45 AM    RDW 13.7 01/15/2012 05:45 AM     01/15/2012 05:45 AM       CMP:   Lab Results   Component Value Date/Time     08/08/2022 07:56 AM    K 4.5 fraction is 55-60%.   Structurally normal tricuspid valve without significant stenosis.  There is mild-moderate tricuspid regurgitation. Pulmonary artery       systolic pressure is normal.     Structurally normal mitral valve without significant stenosis or prolapse.  There is mild mitral regurgitation. Neuro/Psych:               GI/Hepatic/Renal:   (+) morbid obesity          Endo/Other:                     Abdominal:             Vascular: Other Findings:           Anesthesia Plan      general     ASA 3       Induction: intravenous and rapid sequence. MIPS: Postoperative opioids intended, Prophylactic antiemetics administered and Postoperative trial extubation. Plan discussed with CRNA.           Post-op pain plan if not by surgeon: single peripheral nerve block            Shai Oseguera MD   11/11/2022

## 2022-11-11 NOTE — PROGRESS NOTES
Pt status unchanged, R groin CD&I, neurovascular status intact, vss, NSR on tele, ok to transfer back to cath lab.

## 2022-11-11 NOTE — DISCHARGE INSTRUCTIONS
DISCHARGE INSTRUCTIONS    Care of your puncture site:  Remove bandage 24 hours after the procedure. May shower in 24 hours but do not sit in a bathtub/pool of water for 3 days or until the wound is healed. Inspect the site daily and gently clean using soap and water while standing in the shower. Dry thoroughly and apply a Band-Aid that covers the entire site. Do not apply powder or lotion. Normal Observations:  Soreness or tenderness which may last one week. Mild oozing from the incision site. Possible bruising that could last 2 weeks. Activity:  You may resume driving 24 hours following the procedure. You may resume normal activity in 3 days or after the wound heals. Avoid lifting more than 10 pounds for 3 days or until the wound heals. Avoid strenuous exercise or activity for 1 week. Nutrition:  Regular diet     Call your doctor immediately if your condition worsens, for any other concerns, for a follow-up appointment or if you experience any of the following:  Significant bleeding that does not stop after 10 minutes of applying firm pressure on the puncture site. Increased swelling on the groin or leg. Unusual pain, numbness, or tingling of the groin or down the leg. Any signs of infection such as: redness, yellow drainage at the site, swelling or pain.

## 2022-11-11 NOTE — H&P
[facility]   Electrophysiology   Date: 11/11/2022    CC: Afib   HPI: Karla Benavides is a 81 y.o. male with a PMH of CAD, HTN, HLD, COPD, NASIMA and atrial fibrillation    8/9/2022 he was seen by  at [facility] for 6 month follow up. He had complaints of shortness of breath and fatigue. Advised to complete holter and stress test and referred to EP for treatment options. Jacqualine Brittle presents to the office as a new patient. He states he has been in afib for about the past year. He follows Antony Gosselin but does not prefer to be seen at [facility]. He is tired all the time, occasionally can fall asleep sitting up. He has a cpap at home but does not use it as he was unable to tolerate it in the past. He feels his heart racing at times. Denies passing out or blacking out. He works physically and is about to build an addition on his home. He has noticed a decrease in stamina recently and increase in LOZADA. Assessment and plan:   -Persistent Atrial Fibrillation (per pt has been out of rhythm since last year)   ECG today shows Afib   Patient has a [id number] Score of at least 3 (HTN, Age)CAD) on eliquis 5 mg BID    Continue Toprol XL 50 mg daily and Diltiazem 120 mg daily for rate control  We discussed treatment options including antiarrhythmics, rate control with anticoagulation, and ablation. We discussed progressive nature of atrial fibrillation. Treatment success decreases when AF becomes persistent and last more than 6 months. Antiarrhythmic therapy, side effects, benefits and alternative discussed. He is not interested in antiarrhythmic therapy with amiodarone, etc.      Atrial fibrillation ablation procedure was discussed. We discussed the need for repeat procedure. On average patients may need more than one ablation procedure.      Risks associated with ablation include but not limited to allergic reaction to the medications, pain, bleeding, infection, nerve injury, injury to diaphragm(breathing muscle), pulmonary embolus(blood clot in lungs), deep vein blood clot, pneumothorax, hemothorax, acute renal failure, cardiac perforation,  tamponade, need for emergent surgery (open heart), permanent pacemaker, pulmonary vein stenosis, left atrial to esophageal fistula, stroke, myocardial infarction and death. Difference between atrial fibrillation and atrial flutter discussed and treatment discussed. Schedule ablation of atrial fibrillation    -CAD   S/P CABG X3 1/17/12 LIMA-LAD, SVG-OM2 and PDA/LCx,   Managed by     On BB, ASA and Zocor    -HTN  Controlled: 115/80  BP goal <130/80  Home BP monitoring encouraged, printed information provided on how to accurately measure BP at home. Counseled to follow a low salt diet to assure blood pressure remains controlled for cardiovascular risk factor modification. The patient is counseled to get regular exercise 3-5 times per week and maintain a healthy weight reduce cardiovascular risk factors. -NASIMA  Does not use his CPAP, encouraged him to use the machine   Encourage to use machine to prevent long term effects of untreated NASIMA    -Obesity  There is no height or weight on file to calculate BMI. Excessive weight is complicating assessment and treatment. It is placing patient at risk for multiple co-morbidities as well as early death and contributing to the patient's presentation. Discussed weight management with diet and exercise        Patient Active Problem List    Diagnosis Date Noted    [facility] 08/26/2022    CAD (coronary artery disease) 01/11/2012    HTN (hypertension) 01/11/2012    HLD (hyperlipidemia) 01/11/2012    Asthma 01/11/2012    Obesity 01/11/2012     Diagnostic studies:   ECG 8/29/22  Atrial fibrillation     Echo 9/22/2021  CONCLUSIONS   The left ventricle is of normal size with no segmental wall motion abnormalities. Left ventricular function is normal.   The ejection fraction is 55-60%. Structurally normal tricuspid valve without significant stenosis. There is mild-moderate tricuspid regurgitation. Pulmonary artery systolic pressure is normal.  Structurally normal mitral valve without significant stenosis or prolapse. There is mild mitral regurgitation. I independently reviewed the cardiac diagnostic studies, ECG and relevant imaging studies. No results found for: LVEF, LVEFMODE  No results found for: TSHFT4, TSH    Physical Examination:  There were no vitals filed for this visit. Wt Readings from Last 3 Encounters:   08/29/22 (!) 305 lb 9.6 oz (138.6 kg)   05/04/20 (!) 309 lb (140.2 kg)   01/18/18 (!) 301 lb 8 oz (136.8 kg)       Constitutional: Oriented. No distress. Head: Normocephalic and atraumatic. Mouth/Throat: Oropharynx is clear and moist.   Eyes: Conjunctivae normal. EOM are normal.   Neck: Neck supple. No JVD present. Cardiovascular: Normal rate, Irregular rhythm, S1&S2. Pulmonary/Chest: Bilateral respiratory sounds. No rhonchi. Abdominal: Soft. No tenderness. + obese   Musculoskeletal: No tenderness. No edema    Lymphadenopathy: Has no cervical adenopathy. Neurological: Alert and oriented. Follows command, No Gross deficit   Skin: Skin is warm, No rash noted. Psychiatric: Has a normal behavior        Review of System:  [x] Full ROS obtained and negative except as mentioned in HPI    Prior to Admission medications    Medication Sig Start Date End Date Taking?  Authorizing Provider   lisinopril-hydroCHLOROthiazide (PRINZIDE;ZESTORETIC) 20-12.5 MG per tablet Take 0.5 tablets by mouth 2 times daily    Historical Provider, MD   tamsulosin (FLOMAX) 0.4 MG capsule Take 0.4 mg by mouth daily    Historical Provider, MD   Esomeprazole Magnesium (NEXIUM PO) Take by mouth daily    Historical Provider, MD   Naproxen Sodium (ALEVE PO) Take by mouth as needed    Historical Provider, MD   metoprolol tartrate (LOPRESSOR) 25 MG tablet TAKE 1/2 TABLET BY MOUTH TWICE A DAY 3/11/20   Khushboo Bear MD   simvastatin (ZOCOR) 40 MG tablet TAKE 1 TABLET BY MOUTH IN THE EVENING 2/3/20   Khushboo Bear MD   aspirin 325 MG tablet Take 325 mg by mouth daily In hospital medication    Historical Provider, MD       Past Medical History:   Diagnosis Date    CAD (coronary artery disease)     S/P CABG X3  1/17/12 LIMA-LAD, SVG-OM2 and PDA/LCx, LVEF 50-55%    COPD (chronic obstructive pulmonary disease) ([facility]     managed by PCP    Hyperlipidemia     rec semi annual lipids with LDL goal <70    Hypertension     controlled    Tobacco user     cessation discussed        Past Surgical History:   Procedure Laterality Date    CORONARY ARTERY BYPASS GRAFT  1/11/2012    KNEE SURGERY         No Known Allergies    Social History:  Reviewed. reports that he quit smoking about 10 years ago. He has a 50.00 pack-year smoking history. He has never used smokeless tobacco. He reports that he does not drink alcohol and does not use drugs. Family History:  Reviewed. Reviewed. No family history of SCD. Relevant and available labs, and cardiovascular diagnostics reviewed. Reviewed. I independently reviewed all cardiac tracing. I independently reviewed relevant and available cardiac diagnostic tests ECG, CXR, Echo, Stress test, Device interrogation, Holter, CT scan. Outside medical records via Care everywhere reviewed and summarized in H&P above. Complex medical condition with multiple medical problems affecting prognosis and outcome of EP interventions       - The patient is counseled to follow a low salt diet to assure blood pressure remains controlled for cardiovascular risk factor modification.   - The patient is counseled to avoid excess caffeine, and energy drinks as this may exacerbated ectopy and arrhythmia. - The patient is counseled to get regular exercise 3-5 times per week to control cardiovascular risk factors.    - The patient is counseled to lose weigt to control cardiovascular risk factors. All questions and concerns were addressed to the patient/family. Alternatives to my treatment were discussed. I have discussed the above stated plan and the patient verbalized understanding and agreed with the plan. NOTE: This report was transcribed using voice recognition software. Every effort was made to ensure accuracy, however, inadvertent computerized transcription errors may be present. Susy Tong MD, MPH  [address]   Office: [phone]  Fax: [phone]      H&P Update    I have reviewed the history and physical and examined the patient and updated with relevant changes. Consent: I have discussed with the patient and/or the patient representative the indication, alternatives, and the possible risks and/or complications of the planned procedure and the anesthesia methods. The patient and/or patient representative appear to understand and agree to proceed. There were no vitals filed for this visit. Prior to Admission medications    Medication Sig Start Date End Date Taking?  Authorizing Provider   lisinopril-hydroCHLOROthiazide (PRINZIDE;ZESTORETIC) 20-12.5 MG per tablet Take 0.5 tablets by mouth 2 times daily    Historical Provider, MD   tamsulosin (FLOMAX) 0.4 MG capsule Take 0.4 mg by mouth daily    Historical Provider, MD   Esomeprazole Magnesium (NEXIUM PO) Take by mouth daily    Historical Provider, MD   Naproxen Sodium (ALEVE PO) Take by mouth as needed    Historical Provider, MD   metoprolol tartrate (LOPRESSOR) 25 MG tablet TAKE 1/2 TABLET BY MOUTH TWICE A DAY 3/11/20   Nhan Chappell MD   simvastatin (ZOCOR) 40 MG tablet TAKE 1 TABLET BY MOUTH IN THE EVENING 2/3/20   Nhan Chappell MD   aspirin 325 MG tablet Take 325 mg by mouth daily In hospital medication    Historical Provider, MD     Past Medical History:   Diagnosis Date    CAD (coronary artery disease)     S/P CABG X3  1/17/12 LIMA-LAD, SVG-OM2 and PDA/LCx, LVEF 50-55% COPD (chronic obstructive pulmonary disease) ([facility] [address]     managed by PCP    Hyperlipidemia     rec semi annual lipids with LDL goal <70    Hypertension     controlled    Tobacco user     cessation discussed     Past Surgical History:   Procedure Laterality Date    CORONARY ARTERY BYPASS GRAFT  1/11/2012    KNEE SURGERY       No Known Allergies    Documentation and Exam:   I have personally completed a history, physical exam & review of systems for this patient (see notes).     Sedation will be provided by anesthesia team.     Electronically signed by Peter Kwon MD on 11/11/2022 at 9:26 AM

## 2022-11-11 NOTE — PROCEDURES
[address]     Electrophysiology Procedure Note       Date of Procedure: 11/11/2022  Patient's Name: Miriam Orlando  YOB: 1950   Medical Record Number: [id number]  Procedure Performed by: Lisa Coughlin MD    Procedure performed:     Electrophysiology study with radiofrequency ablation of atrial fibrillation and pulmonary vein isolation   Additional ablation with creation of a roof line and inferior-posterior line with posterior wall isolation   3-D electroanatomical mapping of the left atrium   Transseptal puncture through an intact septum    Intracardiac echocardiography. IV drug infusion with Isuprel       Indications for procedure: Symptomatic atrial fibrillation  Miriam Orlando is a 81 y.o. male with symptomatic atrial fibrillation, failed antiarrhythmic therapy. Details of Procedure: The risks, benefits and alternatives of the ablation procedure were discussed with the patient. The risks including, but not limited to, the risks of bleeding, infection, radiation exposure, injury to vascular, cardiac and surrounding structures (including pneumothorax), stroke, cardiac perforation, tamponade, need for emergent open heart surgery, need for pacemaker implantation, esophageal injury and fistula, myocardial infarction and death were discussed in detail. The patient opted to proceed with the ablation. Written informed consent was signed and placed in the chart. Patient was brought to the EP lab in a fasting non-sedate state. Patient underwent general anesthesia by anesthesia team. We initially performed a transesophageal echo that showed no left atrial appendage clot/thrombus. Groins were prepped in a sterile fashion. Femoral venous access was obtained under live ultrasound guidance. The femoral vein was identified with real time visualization of needle passage to the venous lumen. We gained access to right femoral vein.  Two 8F and one long 8.5 sheath was using and were placed in the right femoral vein using modified Seldinger technique and ultrasound guidance. We gained access in both femoral veins. Two 8 Nepali sheaths for ICE and CS catheter were placed in the left femoral vein using modified seldinger technique. Two 8F sheaths into the right femoral vein and two 8F sheath into left femoral vein were introduced using modified Seldinger technique. Then a CS cathter was placed inside the coronary sinus under fluoroscopy for recording and mapping of the left atrium. Using ICE we delineated the left pulmonary vein, left atrial appendage,  mitral valve, and right superior and right inferior pulmonary veins. Prior to full heparinization, ICE images did not reveal any significant pericardial effusion. Patient received a bolus of heparin prior transseptal puncture followed by additional heparin bolus / heparin drip with continuous monitoring of the ACT during the procedure to keep the ACT more than 350 seconds. Transseptal punctures through intact septum were done using ICE, and pressure monitoring and using DWNLD system. Using Penta ray and Carto navigation system a three dimensional electro anatomical mapping of the left atrium, in addition to right and left sided pulmonary vein was created. Using Penta ray catheter voltage mapping of the left atrium was created. There were patchy scar and low voltage fractionated scar on the posterior wall, anterior wall and roof. An esophageal temperature probe in addition to Esophastar catheter was advanced into the esophagus for mapping of the esophagus and careful monitoring of the esophageal temperature during ablation. Ablation stopped if the temperature was rising for more than ~ 0.5 C. All pulmonary veins had PV fascicles, the triggers for the atrial fibrillation. Then wide area circumferential ablation for right and left sided pulmonary veins were performed.  Linear RF lesions was placed around both superior and inferior pulmonary vein in right and left side well outside the pulmonary vein ostium till all four pulmonary veins were isolated. On the posterior wall careful monitoring of esophageal temperature was done to prevent injury to esophagus. On the right side before ablating the anterior wall high amp pacing was performed to check and locate phrenic capture and avoid injury during ablation. Patient remained in atrial fibrillation. A roof line created by linear ablation on the roof connecting right superior to left superior vein. A lower posterior line was created and posterior wall isolated. Posterior wall isolation was confirmed later with pacing maneuvers and lack of captures on posterior wall with high output pacing. Additional ablation lesion were given to the area of reconnection after Isuprel infusion. After ablation EP study and programmed stimulation was performed to rule out any other arrhythmia. The ablation catheter were moved from the left atrium to the left ventricular apex and His bundle position. His bundle potentials was recorded and pacing and recordings were performed from right atrium, coronary sinus and LV apex with the following results:     AH interval was 75 ms  HV interval was 44 ms  Pacing from right atrium, 1:1 conduction over AV node with (AV wenckebach) was 320 ms  Pacing from atrium, AV andrzej ERP was 500/250/240 ms   Atrial ERP was 500/230 ms  Pacing from RV apex, 1:1 conduction over AV node (VA wenckebach) was  ms  Pacing from LV apex, 1:1 retrograde conduction over AV node (VA wenckebach) was less than 300 ms    Procedure was performed with intracardiac ultrasound and 3D mapping system without using fluoroscopy. At the end of the procedure, using ICE we confirmed the lack of any pericardial effusion. Protamine was given to reverse the heapin. Sheaths were removed using and Perclose device was used for closure of the access sites.      The patient

## 2022-11-12 LAB
A/G RATIO: 1.3 (ref 1.1–2.2)
ALBUMIN SERPL-MCNC: 4.3 G/DL (ref 3.4–5)
ALP BLD-CCNC: 77 U/L (ref 40–129)
ALT SERPL-CCNC: 10 U/L (ref 10–40)
ANION GAP SERPL CALCULATED.3IONS-SCNC: 11 MMOL/L (ref 3–16)
AST SERPL-CCNC: 17 U/L (ref 15–37)
BILIRUB SERPL-MCNC: 0.6 MG/DL (ref 0–1)
BUN BLDV-MCNC: 26 MG/DL (ref 7–20)
CALCIUM SERPL-MCNC: 9.9 MG/DL (ref 8.3–10.6)
CHLORIDE BLD-SCNC: 103 MMOL/L (ref 99–110)
CO2: 25 MMOL/L (ref 21–32)
CREAT SERPL-MCNC: 1.1 MG/DL (ref 0.8–1.3)
GFR SERPL CREATININE-BSD FRML MDRD: >60 ML/MIN/{1.73_M2}
GLUCOSE BLD-MCNC: 128 MG/DL (ref 70–99)
MAGNESIUM: 2.2 MG/DL (ref 1.8–2.4)
POTASSIUM SERPL-SCNC: 4.5 MMOL/L (ref 3.5–5.1)
SODIUM BLD-SCNC: 139 MMOL/L (ref 136–145)
TOTAL PROTEIN: 7.6 G/DL (ref 6.4–8.2)

## 2022-11-14 LAB
POC ACT LR: >400 SEC
POC ACT LR: >400 SEC

## 2022-11-23 ENCOUNTER — TELEPHONE (OUTPATIENT)
Dept: CARDIOLOGY CLINIC | Age: 72
End: 2022-11-23

## 2022-11-23 NOTE — TELEPHONE ENCOUNTER
Pt states he has clear drainage from ablation incision over the past 2-3 days. No redness, pain or warmth. Please call to advise.

## 2022-11-23 NOTE — TELEPHONE ENCOUNTER
Spoke with Jaylyn Bee. He states he has been coughing a lot with a cold he has had for a couple weeks. Site is not tender, red or warm. Denies numbness of tingling in leg. B enies purulent drainage. States site has clear drainage that he dabs a way a couple of times a day. Reviewed  wound care instructions. Advised  To call if  develops signs and symptoms of infection.      Verbalized understanding

## 2022-12-13 ENCOUNTER — TELEPHONE (OUTPATIENT)
Dept: CARDIOLOGY CLINIC | Age: 72
End: 2022-12-13

## 2022-12-13 NOTE — TELEPHONE ENCOUNTER
Call placed to Maria Luisa Quan. Advised that it is not out of normal to go in and out of rhythm for the first three months following his ablation. He appears to have occasional skipped beats.  He will call if he has recurrent afib

## 2022-12-13 NOTE — TELEPHONE ENCOUNTER
Pt called in stating that he woke up this morning and could feel his heart fluttering so he checked his vitals and his HR was in the 120s. Pt sat down to relax and it started to go back down. Pt has been watching his Hr and states that its all over the place. Pt states that he isn't feeling bad  just can tell his heart Is back out of rhythm. Pt is requesting a call back to advise him on if he needs to be seen , or what he needs to do.     Pt can be reached at [phone]

## 2023-02-01 PROBLEM — G47.33 OSA (OBSTRUCTIVE SLEEP APNEA): Status: ACTIVE | Noted: 2023-02-01

## 2023-02-01 NOTE — PROGRESS NOTES
[address]   Electrophysiology Follow Up  Date: 2/7/2023    CC: AF   HPI: Mary Wilcox is a 81 y.o. male with a PMH of CAD, HTN, HLD, COPD, NASIMA and atrial fibrillation    8/9/2022 he was seen by  at [facility] for 6 month follow up. He had complaints of shortness of breath and fatigue. Advised to complete holter and stress test and referred to EP for treatment options. 11/11/2022 RFCA with PVI, Huel Leyden presents to the office in follow up. He states he has lost over 30 pounds, was on Mounjaro but had bleeding. The bleeding resolved since cessation of Mounjaro. He is feeling well since his ablation and denies recurrent episodes. He is back to his normal activity. Patient denies lightheadedness, dizziness, chest pain, palpitations, orthopnea, edema, presyncope or syncope. Assessment and plan:   -Persistent Atrial Fibrillation    Feeling well since ablation, denies recurrent episodes of atrial fibrillation. Continue working on risk factor modification, has lost > 30 pounds   ECG today shows Sinus   11/11/2022 RFCA PVI, PWI    High [id number] score, high risk for stroke/thromboembolism. Patient has a [id number]-NUBc Score of at least 3 (HTN, Age)CAD)   Continue eliquis 5 mg BID  Continue lopressor 12.5 mg BID and  Diltiazem 120 mg daily for rate control     He is not interested in antiarrhythmic therapy with amiodarone, etc.     -CAD   S/P CABG X3 1/17/12 LIMA-LAD, SVG-OM2 and PDA/LCx,   Continue BB, ASA and Zocor   ASA changed to 81 mg/day. Dr. Praful Abarcaem     -HTN  Controlled: 106/72  BP goal <130/80  Home BP monitoring encouraged, printed information provided on how to accurately measure BP at home. Counseled to follow a low salt diet to assure blood pressure remains controlled for cardiovascular risk factor modification. The patient is counseled to get regular exercise 3-5 times per week and maintain a healthy weight reduce cardiovascular risk factors.       -NASIMA  Does not use his CPAP, encouraged him to use the machine   Encourage to use machine to prevent long term effects of untreated NASIMA    -Obesity Body mass index is 39.74 kg/m². Has lost over 30 pounds  Excessive weight is complicating assessment and treatment. It is placing patient at risk for multiple co-morbidities as well as early death and contributing to the patient's presentation. Discussed weight management with diet and exercise     Patient is high risk of having atrial arrhythmias because of multiple comorbidities CAD, NASIMA and obesity. Patient Active Problem List    Diagnosis Date Noted    NASIMA (obstructive sleep apnea) 02/01/2023    Persistent atrial fibrillation ([address] 11/11/2022    PAF (paroxysmal atrial fibrillation) ([facility] [address] 08/26/2022    CAD (coronary artery disease) 01/11/2012    HTN (hypertension) 01/11/2012    HLD (hyperlipidemia) 01/11/2012    Asthma 01/11/2012    Obesity 01/11/2012     Diagnostic studies:   ECG 2/7/23  SR QTcH 405,    AMNA 11/11/2022   Normal left ventricle size, wall thickness, and systolic function with an   estimated ejection fraction of 55%. No regional wall motion abnormalities   are seen. There is no evidence of mass or thrombus in the left atrium or appendage. Echo 9/22/2021  CONCLUSIONS   The left ventricle is of normal size with no segmental wall motion abnormalities. Left ventricular function is normal.   The ejection fraction is 55-60%. Structurally normal tricuspid valve without significant stenosis. There is mild-moderate tricuspid regurgitation. Pulmonary artery systolic pressure is normal.  Structurally normal mitral valve without significant stenosis or prolapse. There is mild mitral regurgitation. I independently reviewed the cardiac diagnostic studies, ECG and relevant imaging studies.      Lab Results   Component Value Date    LVEF 55 11/11/2022     No results found for: TSHFT4, TSH    Physical Examination:  Vitals:    02/07/23 1416   BP: 106/72 Pulse: 62   SpO2: 99%        Wt Readings from Last 3 Encounters:   02/07/23 293 lb (132.9 kg)   11/11/22 (!) 305 lb (138.3 kg)   08/29/22 (!) 305 lb 9.6 oz (138.6 kg)       Constitutional: Oriented. No distress. Head: Normocephalic and atraumatic. Mouth/Throat: Oropharynx is clear and moist.   Eyes: Conjunctivae normal. EOM are normal.   Neck: Neck supple. No JVD present. Cardiovascular: Normal rate, Irregular rhythm, S1&S2. Pulmonary/Chest: Bilateral respiratory sounds. No rhonchi. Abdominal: Soft. No tenderness. + obese   Musculoskeletal: No tenderness. No edema    Lymphadenopathy: Has no cervical adenopathy. Neurological: Alert and oriented. Follows command, No Gross deficit   Skin: Skin is warm, No rash noted. Psychiatric: Has a normal behavior        Review of System:  [x] Full ROS obtained and negative except as mentioned in HPI    Prior to Admission medications    Medication Sig Start Date End Date Taking?  Authorizing Provider   aspirin EC 81 MG EC tablet Take 1 tablet by mouth daily 2/7/23  Yes Gene Guerra MD   apixaban (ELIQUIS) 5 MG TABS tablet Take 5 mg by mouth 2 times daily   Yes Historical Provider, MD   dilTIAZem (CARDIZEM CD) 120 MG extended release capsule Take 120 mg by mouth daily   Yes Historical Provider, MD   pantoprazole (PROTONIX) 40 MG tablet Take 40 mg by mouth daily   Yes Historical Provider, MD   lisinopril-hydroCHLOROthiazide (PRINZIDE;ZESTORETIC) 20-12.5 MG per tablet Take 1 tablet by mouth daily   Yes Historical Provider, MD   tamsulosin (FLOMAX) 0.4 MG capsule Take 0.4 mg by mouth daily   Yes Historical Provider, MD   metoprolol tartrate (LOPRESSOR) 25 MG tablet TAKE 1/2 TABLET BY MOUTH TWICE A DAY 3/11/20  Yes Kitty Blue MD   simvastatin (ZOCOR) 40 MG tablet TAKE 1 TABLET BY MOUTH IN THE EVENING 2/3/20  Yes Kitty Blue MD       Past Medical History:   Diagnosis Date    CAD (coronary artery disease)     S/P CABG X3  1/17/12 LIMA-LAD, SVG-OM2 and PDA/LCx, LVEF 50-55%    COPD (chronic obstructive pulmonary disease) ([facility] [address]     managed by PCP    Hyperlipidemia     rec semi annual lipids with LDL goal <70    Hypertension     controlled    Tobacco user     cessation discussed        Past Surgical History:   Procedure Laterality Date    CORONARY ARTERY BYPASS GRAFT  1/11/2012    KNEE SURGERY         No Known Allergies    Social History:  Reviewed. reports that he quit smoking about 11 years ago. He has a 50.00 pack-year smoking history. He has never used smokeless tobacco. He reports that he does not drink alcohol and does not use drugs. Family History:  Reviewed. Reviewed. No family history of SCD. Relevant and available labs, and cardiovascular diagnostics reviewed. Reviewed. I independently reviewed all cardiac tracing. I independently reviewed relevant and available cardiac diagnostic tests ECG, CXR, Echo, Stress test, Device interrogation, Holter, CT scan. Outside medical records via Care everywhere reviewed and summarized in H&P above. Complex medical condition with multiple medical problems affecting prognosis and outcome of EP interventions       - The patient is counseled to follow a low salt diet to assure blood pressure remains controlled for cardiovascular risk factor modification.   - The patient is counseled to avoid excess caffeine, and energy drinks as this may exacerbated ectopy and arrhythmia. - The patient is counseled to get regular exercise 3-5 times per week to control cardiovascular risk factors. - The patient is counseled to lose weigt to control cardiovascular risk factors. All questions and concerns were addressed to the patient/family. Alternatives to my treatment were discussed. I have discussed the above stated plan and the patient verbalized understanding and agreed with the plan. Scribe attestation:  This note was scribed in the presence of Karol Vieyra MD by Mona Reardon RN  Physician Attestation: IDr. [facility], confirm that the scribe's documentation has been prepared under my direction and personally reviewed by me in its entirety. I also confirm that the note above accurately reflects all work, treatment, procedures, and medical decision making performed by me. NOTE: This report was transcribed using voice recognition software. Every effort was made to ensure accuracy, however, inadvertent computerized transcription errors may be present.      [facility], MD, MPH  [facility]   Office: [phone]  Fax: [phone]

## 2023-02-07 ENCOUNTER — OFFICE VISIT (OUTPATIENT)
Dept: CARDIOLOGY CLINIC | Age: 73
End: 2023-02-07
Payer: MEDICARE

## 2023-02-07 VITALS
DIASTOLIC BLOOD PRESSURE: 72 MMHG | HEART RATE: 62 BPM | OXYGEN SATURATION: 99 % | WEIGHT: 293 LBS | BODY MASS INDEX: 39.68 KG/M2 | SYSTOLIC BLOOD PRESSURE: 106 MMHG | HEIGHT: 72 IN

## 2023-02-07 DIAGNOSIS — I48.0 PAF (PAROXYSMAL ATRIAL FIBRILLATION) (HCC): Primary | ICD-10-CM

## 2023-02-07 DIAGNOSIS — E66.01 CLASS 3 SEVERE OBESITY DUE TO EXCESS CALORIES WITHOUT SERIOUS COMORBIDITY IN ADULT, UNSPECIFIED BMI (HCC): ICD-10-CM

## 2023-02-07 DIAGNOSIS — G47.33 OSA (OBSTRUCTIVE SLEEP APNEA): ICD-10-CM

## 2023-02-07 DIAGNOSIS — I25.810 CORONARY ARTERY DISEASE INVOLVING CORONARY BYPASS GRAFT OF NATIVE HEART WITHOUT ANGINA PECTORIS: ICD-10-CM

## 2023-02-07 DIAGNOSIS — I10 PRIMARY HYPERTENSION: ICD-10-CM

## 2023-02-07 PROCEDURE — 3078F DIAST BP <80 MM HG: CPT | Performed by: INTERNAL MEDICINE

## 2023-02-07 PROCEDURE — G8427 DOCREV CUR MEDS BY ELIG CLIN: HCPCS | Performed by: INTERNAL MEDICINE

## 2023-02-07 PROCEDURE — 3074F SYST BP LT 130 MM HG: CPT | Performed by: INTERNAL MEDICINE

## 2023-02-07 PROCEDURE — 3017F COLORECTAL CA SCREEN DOC REV: CPT | Performed by: INTERNAL MEDICINE

## 2023-02-07 PROCEDURE — G8484 FLU IMMUNIZE NO ADMIN: HCPCS | Performed by: INTERNAL MEDICINE

## 2023-02-07 PROCEDURE — 99214 OFFICE O/P EST MOD 30 MIN: CPT | Performed by: INTERNAL MEDICINE

## 2023-02-07 PROCEDURE — 1036F TOBACCO NON-USER: CPT | Performed by: INTERNAL MEDICINE

## 2023-02-07 PROCEDURE — G8417 CALC BMI ABV UP PARAM F/U: HCPCS | Performed by: INTERNAL MEDICINE

## 2023-02-07 PROCEDURE — 1123F ACP DISCUSS/DSCN MKR DOCD: CPT | Performed by: INTERNAL MEDICINE

## 2023-02-07 PROCEDURE — 93000 ELECTROCARDIOGRAM COMPLETE: CPT | Performed by: INTERNAL MEDICINE

## 2023-02-07 RX ORDER — ASPIRIN 81 MG/1
81 TABLET ORAL DAILY
Qty: 90 TABLET | Refills: 1 | Status: SHIPPED | OUTPATIENT
Start: 2023-02-07

## 2023-06-02 ENCOUNTER — OFFICE VISIT (OUTPATIENT)
Dept: ORTHOPEDIC SURGERY | Age: 73
End: 2023-06-02
Payer: MEDICARE

## 2023-06-02 VITALS — HEIGHT: 72 IN | WEIGHT: 293 LBS | RESPIRATION RATE: 16 BRPM | BODY MASS INDEX: 39.68 KG/M2

## 2023-06-02 DIAGNOSIS — T84.012A FAILED TOTAL RIGHT KNEE REPLACEMENT, INITIAL ENCOUNTER (HCC): ICD-10-CM

## 2023-06-02 DIAGNOSIS — Z96.651 TOTAL KNEE REPLACEMENT STATUS, RIGHT: Primary | ICD-10-CM

## 2023-06-02 PROCEDURE — G8417 CALC BMI ABV UP PARAM F/U: HCPCS | Performed by: PHYSICIAN ASSISTANT

## 2023-06-02 PROCEDURE — G8427 DOCREV CUR MEDS BY ELIG CLIN: HCPCS | Performed by: PHYSICIAN ASSISTANT

## 2023-06-02 PROCEDURE — 99204 OFFICE O/P NEW MOD 45 MIN: CPT | Performed by: PHYSICIAN ASSISTANT

## 2023-06-02 PROCEDURE — 3017F COLORECTAL CA SCREEN DOC REV: CPT | Performed by: PHYSICIAN ASSISTANT

## 2023-06-02 PROCEDURE — 1123F ACP DISCUSS/DSCN MKR DOCD: CPT | Performed by: PHYSICIAN ASSISTANT

## 2023-06-02 PROCEDURE — 1036F TOBACCO NON-USER: CPT | Performed by: PHYSICIAN ASSISTANT

## 2023-06-06 NOTE — PROGRESS NOTES
This dictation was done with iFormularyon dictation and may contain mechanical errors related to translation. I have today reviewed with Jami Sena the clinically relevant, past medical history, medications, allergies, family history, social history, and Review Of Systems form the patients most recent history form & I have documented any details relevant to today's presenting complaints in my history below. Mr. Carol Ann Hobson self-reported past medical history, medications, allergies, family history, social history, and Review Of Systems form has been scanned into the chart under the \"Media\" tab. Subjective:  Jami Sena is a 81 y.o. who is here as a new patient with orthopedic complaining pain in his right knee for about 2 years now consistent with an injury after falling off a ladder. He had previous bilateral total knee replacement done by Dr. Yanique Curtis in 2015. He did extremely well was functioning well. 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 About 2 years ago he fell off a ladder and since then he had hard time getting his leg to be over straight and he has had anterior soreness. He is trying to exercise and activities to be related Dr. GRAVES doctor at this point comes in here for evaluation and treatment. We sent him for an AP lateral and sunrise view of his right knee.       Patient Active Problem List   Diagnosis    CAD (coronary artery disease)    HTN (hypertension)    HLD (hyperlipidemia)    Asthma    Obesity    PAF (paroxysmal atrial fibrillation) (HCC)    Persistent atrial fibrillation (HCC)    NASIMA (obstructive sleep apnea)           Current Outpatient Medications on File Prior to Visit   Medication Sig Dispense Refill    aspirin EC 81 MG EC tablet Take 1 tablet by mouth daily 90 tablet 1    apixaban (ELIQUIS) 5 MG TABS tablet Take 5 mg by mouth 2 times daily      dilTIAZem (CARDIZEM CD) 120 MG extended release capsule Take 120 mg by mouth daily

## 2023-06-07 ENCOUNTER — TELEPHONE (OUTPATIENT)
Dept: ORTHOPEDIC SURGERY | Age: 73
End: 2023-06-07

## 2023-06-30 ENCOUNTER — TELEPHONE (OUTPATIENT)
Dept: ORTHOPEDIC SURGERY | Age: 73
End: 2023-06-30

## 2023-06-30 NOTE — TELEPHONE ENCOUNTER
General Question     Subject: SCHEDULE R KNEE SX  Patient and /or Facility Request: Ashish Kamara  Contact Number: [phone]    PATIENT CALLED IN TO SEE IF HE CAN GET AN SCHEDULE SX FOR HIS RT KNEE. Pilar Martin     PLEASE ADVISE

## 2023-07-21 RX ORDER — ASPIRIN 81 MG/1
TABLET, COATED ORAL
Qty: 90 TABLET | Refills: 1 | Status: SHIPPED | OUTPATIENT
Start: 2023-07-21

## 2023-08-15 ENCOUNTER — OFFICE VISIT (OUTPATIENT)
Dept: CARDIOLOGY CLINIC | Age: 73
End: 2023-08-15
Payer: MEDICARE

## 2023-08-15 VITALS
SYSTOLIC BLOOD PRESSURE: 112 MMHG | HEART RATE: 80 BPM | OXYGEN SATURATION: 97 % | BODY MASS INDEX: 40.9 KG/M2 | WEIGHT: 302 LBS | DIASTOLIC BLOOD PRESSURE: 70 MMHG | HEIGHT: 72 IN

## 2023-08-15 DIAGNOSIS — G47.33 OSA (OBSTRUCTIVE SLEEP APNEA): ICD-10-CM

## 2023-08-15 DIAGNOSIS — I10 PRIMARY HYPERTENSION: ICD-10-CM

## 2023-08-15 DIAGNOSIS — I25.810 CORONARY ARTERY DISEASE INVOLVING CORONARY BYPASS GRAFT OF NATIVE HEART WITHOUT ANGINA PECTORIS: ICD-10-CM

## 2023-08-15 DIAGNOSIS — I48.19 PERSISTENT ATRIAL FIBRILLATION (HCC): Primary | ICD-10-CM

## 2023-08-15 PROCEDURE — 1123F ACP DISCUSS/DSCN MKR DOCD: CPT | Performed by: INTERNAL MEDICINE

## 2023-08-15 PROCEDURE — 3074F SYST BP LT 130 MM HG: CPT | Performed by: INTERNAL MEDICINE

## 2023-08-15 PROCEDURE — G8427 DOCREV CUR MEDS BY ELIG CLIN: HCPCS | Performed by: INTERNAL MEDICINE

## 2023-08-15 PROCEDURE — G8417 CALC BMI ABV UP PARAM F/U: HCPCS | Performed by: INTERNAL MEDICINE

## 2023-08-15 PROCEDURE — 99214 OFFICE O/P EST MOD 30 MIN: CPT | Performed by: INTERNAL MEDICINE

## 2023-08-15 PROCEDURE — 1036F TOBACCO NON-USER: CPT | Performed by: INTERNAL MEDICINE

## 2023-08-15 PROCEDURE — 3017F COLORECTAL CA SCREEN DOC REV: CPT | Performed by: INTERNAL MEDICINE

## 2023-08-15 PROCEDURE — 3078F DIAST BP <80 MM HG: CPT | Performed by: INTERNAL MEDICINE

## 2023-08-15 PROCEDURE — 93000 ELECTROCARDIOGRAM COMPLETE: CPT | Performed by: INTERNAL MEDICINE

## 2023-08-15 RX ORDER — ROSUVASTATIN CALCIUM 40 MG/1
40 TABLET, COATED ORAL DAILY
COMMUNITY
Start: 2023-06-23 | End: 2023-08-15

## 2023-08-15 RX ORDER — ROSUVASTATIN CALCIUM 20 MG/1
20 TABLET, COATED ORAL DAILY
Qty: 90 TABLET | Refills: 3 | Status: SHIPPED | OUTPATIENT
Start: 2023-08-15

## 2024-01-02 NOTE — PROGRESS NOTES
cardiovascular related activities with a goal of 150 min/week of moderate level activity or 10,000 steps per day. Encouraged to perform as much activity as tolerated    I have addressed the patient's cardiac risk factors and adjusted pharmacologic treatment as needed. In addition, I have reinforced the need for patient directed risk factor modification.    I independently reviewed the ECG    All questions and concerns were addressed with the patient. Alternatives to treatment were discussed.     Thank you for allowing to us to participate in the care of N[facility]   Office: [phone]

## 2024-01-03 ENCOUNTER — TELEPHONE (OUTPATIENT)
Dept: CARDIOLOGY CLINIC | Age: 74
End: 2024-01-03

## 2024-01-03 ENCOUNTER — OFFICE VISIT (OUTPATIENT)
Dept: CARDIOLOGY CLINIC | Age: 74
End: 2024-01-03
Payer: MEDICARE

## 2024-01-03 VITALS
SYSTOLIC BLOOD PRESSURE: 118 MMHG | OXYGEN SATURATION: 98 % | HEIGHT: 72 IN | WEIGHT: 298 LBS | HEART RATE: 130 BPM | DIASTOLIC BLOOD PRESSURE: 60 MMHG | BODY MASS INDEX: 40.36 KG/M2

## 2024-01-03 DIAGNOSIS — G47.33 OSA (OBSTRUCTIVE SLEEP APNEA): ICD-10-CM

## 2024-01-03 DIAGNOSIS — I48.0 PAF (PAROXYSMAL ATRIAL FIBRILLATION) (HCC): Primary | ICD-10-CM

## 2024-01-03 DIAGNOSIS — I25.810 CORONARY ARTERY DISEASE INVOLVING CORONARY BYPASS GRAFT OF NATIVE HEART WITHOUT ANGINA PECTORIS: ICD-10-CM

## 2024-01-03 DIAGNOSIS — E66.01 CLASS 3 SEVERE OBESITY DUE TO EXCESS CALORIES WITHOUT SERIOUS COMORBIDITY IN ADULT, UNSPECIFIED BMI (HCC): ICD-10-CM

## 2024-01-03 DIAGNOSIS — E78.00 PURE HYPERCHOLESTEROLEMIA: ICD-10-CM

## 2024-01-03 DIAGNOSIS — I10 PRIMARY HYPERTENSION: ICD-10-CM

## 2024-01-03 PROCEDURE — 3074F SYST BP LT 130 MM HG: CPT | Performed by: NURSE PRACTITIONER

## 2024-01-03 PROCEDURE — G8427 DOCREV CUR MEDS BY ELIG CLIN: HCPCS | Performed by: NURSE PRACTITIONER

## 2024-01-03 PROCEDURE — 99214 OFFICE O/P EST MOD 30 MIN: CPT | Performed by: NURSE PRACTITIONER

## 2024-01-03 PROCEDURE — 3078F DIAST BP <80 MM HG: CPT | Performed by: NURSE PRACTITIONER

## 2024-01-03 PROCEDURE — 1036F TOBACCO NON-USER: CPT | Performed by: NURSE PRACTITIONER

## 2024-01-03 PROCEDURE — 3017F COLORECTAL CA SCREEN DOC REV: CPT | Performed by: NURSE PRACTITIONER

## 2024-01-03 PROCEDURE — 1123F ACP DISCUSS/DSCN MKR DOCD: CPT | Performed by: NURSE PRACTITIONER

## 2024-01-03 PROCEDURE — 93000 ELECTROCARDIOGRAM COMPLETE: CPT | Performed by: NURSE PRACTITIONER

## 2024-01-03 PROCEDURE — G8417 CALC BMI ABV UP PARAM F/U: HCPCS | Performed by: NURSE PRACTITIONER

## 2024-01-03 PROCEDURE — G8484 FLU IMMUNIZE NO ADMIN: HCPCS | Performed by: NURSE PRACTITIONER

## 2024-01-03 RX ORDER — DIGOXIN 125 MCG
125 TABLET ORAL DAILY
Qty: 90 TABLET | Refills: 1 | Status: SHIPPED | OUTPATIENT
Start: 2024-01-03

## 2024-01-03 RX ORDER — DILTIAZEM HYDROCHLORIDE 120 MG/1
120 CAPSULE, COATED, EXTENDED RELEASE ORAL 2 TIMES DAILY
Qty: 30 CAPSULE | Refills: 0 | Status: SHIPPED
Start: 2024-01-03

## 2024-01-03 RX ORDER — DIGOXIN 125 MCG
125 TABLET ORAL DAILY
Qty: 30 TABLET | Refills: 3 | Status: SHIPPED | OUTPATIENT
Start: 2024-01-03 | End: 2024-01-03 | Stop reason: SDUPTHER

## 2024-01-03 RX ORDER — DIGOXIN 125 MCG
125 TABLET ORAL DAILY
Qty: 90 TABLET | OUTPATIENT
Start: 2024-01-03

## 2024-01-03 NOTE — TELEPHONE ENCOUNTER
Spoke with the patient and got him scheduled for procedure. We went over instructions below and he verbalized understanding.     Procedure - CV   Date: 1/11/2024  Arrival time: 9:30 am   Procedure time: 10:30 am        The day of your procedure you will need to check in at the Registration Desk in the Main Lobby.     PRE-PROCEDURE INSTRUCTIONS              Do not eat or drink anything after midnight the night before your procedure.              Take all your medications with a sip of water in the morning.              Do not hold your Eliquis, metoprolol or cardizem the morning of the procedure              Please have a responsible adult to drive you home after your procedure.     If you have any questions regarding your procedure itself or your medications, please call [phone] and ask to speak to an EP nurse.    Torsten updated / Added in Epic / emailed cath lab

## 2024-01-03 NOTE — PATIENT INSTRUCTIONS
1.Continue metoprolol 25 mg twice per day and Increase cardizem to 120 mg twice per day  2. Start digoxin 125 mcg daily  3. Schedule for cardioversion next week

## 2024-01-11 ENCOUNTER — HOSPITAL ENCOUNTER (OUTPATIENT)
Dept: CARDIAC CATH/INVASIVE PROCEDURES | Age: 74
Discharge: HOME | End: 2024-01-11
Attending: INTERNAL MEDICINE | Admitting: INTERNAL MEDICINE
Payer: MEDICARE

## 2024-01-11 VITALS
HEIGHT: 72 IN | DIASTOLIC BLOOD PRESSURE: 70 MMHG | TEMPERATURE: 98 F | HEART RATE: 70 BPM | SYSTOLIC BLOOD PRESSURE: 110 MMHG | WEIGHT: 298 LBS | OXYGEN SATURATION: 98 % | RESPIRATION RATE: 13 BRPM | BODY MASS INDEX: 40.36 KG/M2

## 2024-01-11 LAB
DEPRECATED RDW RBC AUTO: 14.2 % (ref 12.4–15.4)
EKG ATRIAL RATE: 234 BPM
EKG ATRIAL RATE: 69 BPM
EKG DIAGNOSIS: NORMAL
EKG DIAGNOSIS: NORMAL
EKG P AXIS: 31 DEGREES
EKG P-R INTERVAL: 208 MS
EKG Q-T INTERVAL: 354 MS
EKG Q-T INTERVAL: 400 MS
EKG QRS DURATION: 102 MS
EKG QRS DURATION: 98 MS
EKG QTC CALCULATION (BAZETT): 428 MS
EKG QTC CALCULATION (BAZETT): 444 MS
EKG R AXIS: -16 DEGREES
EKG R AXIS: -5 DEGREES
EKG T AXIS: 42 DEGREES
EKG T AXIS: 52 DEGREES
EKG VENTRICULAR RATE: 69 BPM
EKG VENTRICULAR RATE: 95 BPM
HCT VFR BLD AUTO: 40.6 % (ref 40.5–52.5)
HGB BLD-MCNC: 13.4 G/DL (ref 13.5–17.5)
MCH RBC QN AUTO: 30.7 PG (ref 26–34)
MCHC RBC AUTO-ENTMCNC: 32.9 G/DL (ref 31–36)
MCV RBC AUTO: 93.1 FL (ref 80–100)
PLATELET # BLD AUTO: 200 K/UL (ref 135–450)
PMV BLD AUTO: 7.6 FL (ref 5–10.5)
RBC # BLD AUTO: 4.36 M/UL (ref 4.2–5.9)
WBC # BLD AUTO: 6.7 K/UL (ref 4–11)

## 2024-01-11 PROCEDURE — 99152 MOD SED SAME PHYS/QHP 5/>YRS: CPT | Performed by: INTERNAL MEDICINE

## 2024-01-11 PROCEDURE — 85027 COMPLETE CBC AUTOMATED: CPT

## 2024-01-11 PROCEDURE — 93010 ELECTROCARDIOGRAM REPORT: CPT | Performed by: INTERNAL MEDICINE

## 2024-01-11 PROCEDURE — 36415 COLL VENOUS BLD VENIPUNCTURE: CPT

## 2024-01-11 PROCEDURE — 92960 CARDIOVERSION ELECTRIC EXT: CPT

## 2024-01-11 PROCEDURE — 93005 ELECTROCARDIOGRAM TRACING: CPT | Performed by: INTERNAL MEDICINE

## 2024-01-11 PROCEDURE — 7100000010 HC PHASE II RECOVERY - FIRST 15 MIN

## 2024-01-11 PROCEDURE — 92960 CARDIOVERSION ELECTRIC EXT: CPT | Performed by: INTERNAL MEDICINE

## 2024-01-11 RX ORDER — SODIUM CHLORIDE 9 MG/ML
INJECTION, SOLUTION INTRAVENOUS PRN
Status: DISCONTINUED | OUTPATIENT
Start: 2024-01-11 | End: 2024-01-11 | Stop reason: HOSPADM

## 2024-01-11 RX ORDER — SODIUM CHLORIDE 0.9 % (FLUSH) 0.9 %
5-40 SYRINGE (ML) INJECTION EVERY 12 HOURS SCHEDULED
Status: DISCONTINUED | OUTPATIENT
Start: 2024-01-11 | End: 2024-01-11 | Stop reason: HOSPADM

## 2024-01-11 RX ORDER — SODIUM CHLORIDE 0.9 % (FLUSH) 0.9 %
5-40 SYRINGE (ML) INJECTION PRN
Status: DISCONTINUED | OUTPATIENT
Start: 2024-01-11 | End: 2024-01-11 | Stop reason: HOSPADM

## 2024-01-11 NOTE — DISCHARGE INSTRUCTIONS
CARDIOVERSION DISCHARGE INSTRUCTIONS    No driving for 24 hours. We strongly recommend that a responsible adult stay with you for the next 24 hours.    Continue Eliquis.    Hydrocortisone 1% cream to reddened areas as needed.       Phone: [phone]

## 2024-01-11 NOTE — H&P
50-55%    COPD (chronic obstructive pulmonary disease) (HCC)     managed by PCP    Hyperlipidemia     rec semi annual lipids with LDL goal <70    Hypertension     controlled    Tobacco user     cessation discussed     Past Surgical History:   Procedure Laterality Date    CORONARY ARTERY BYPASS GRAFT  1/11/2012    KNEE SURGERY       No Known Allergies    Pre-Sedation Documentation and Exam:   I have personally completed a history, physical exam & review of systems for this patient (see notes).    Mallampati Airway Assessment:  Class II     Prior History of Anesthesia Complications:   None    ASA Classification:  Class 3 - A patient with severe systemic disease that limits activity but is not incapacitating    Sedation/ Anesthesia Plan:   Intravenous sedation    Medications Planned:   Brevital intravenously     Patient is an appropriate candidate for plan of sedation:   Yes    Electronically signed by Stu Hyde MD on 1/11/2024 at 10:11 AM

## 2024-01-11 NOTE — PROGRESS NOTES
CATH LAB PROCEDURE LOG - CARDIOVERSION      PRE PROCEDURE    DATE: 1/11/2024 ARRIVAL TO CATH LAB: 9:22 AM    ID & ALLERGY BAND: On    CONSENT: Yes    NPO SINCE: Midnight    IV SITE: Started in left arm.     Pt arrived to Cath Lab.   Plan of Care: Hemodynamics and cardiac rhythm will remain stable. Comfort level will be maintained. Respiratory function will remain adequate. Pt/family will verbalize understanding of the procedure. Procedure will be tolerated without complications. Patient will recover from procedure without complications.   ID armband on patient and identification verified.   Informed consent obtained.   Non invasive blood pressure cuff applied, monitoring initiated.   EKG pads and pulse oximeter applied, monitoring initiated.   Instructions given. Patient and / or family verbalize understanding.   H&P will be documented by physician in Epic.   Pt has been NPO since midnight.     Post DCCV EKG ordered? Yes    Pre CV Rhythm: Atrial Fibrillation      CARDIOVERSION    Timeout and fire safety completed.    TIMEOUT TIME: 10:22 AM    CORRECT PATIENT VERIFIED. MEMBERS OF THE SURGICAL TEAM/VISITORS INTRODUCED. ALLERGIES ANNOUNCED. CORRECT PROCEDURE VERIFIED. CORRECT PROCEDURAL SITE VERIFIED. CONSENTS VERIFIED. IMPLANT EQUIPMENT, ADDITIONAL SERVICES, SPECIAL REQUIREMENTS AVAILABLE. MEDICATIONS LABELED AND AVAILABLE. APPROPRIATE PRE MEDS HAVE BEEN ADMINISTERED.    FIRE SAFETY: ALCOHOL PREP SOLUTION HAD SUFFICIENT TIME TO DISSIPATE IF USED. FIRE SAFETY: SURGICAL SITE OR INCISION ABOVE THE XIPHOID. YES=1, NO=0. FIRE SAFETY: OPEN OXYGEN SOURCE. YES=1, NO=0. FIRE SAFETY: AVAILABLE IGNITION SOURCE. YES=1, NO=0. FIRE SAFETY: SCORE TOTAL = 1.     Procedure Medication:     10:22 AM - Brevital 70 mg IV given by Dr ODELL    Synchronized Cardioversion performed at 360 joules    Rhythm was converted to Normal Sinus Rhythm    10:27 AM Pt waking up, respirations spontaneous, able to converse appropriately, follows commands,

## 2024-01-11 NOTE — PROGRESS NOTES
Up in chair. No neuro deficits. Taking in fluids well.        Patient/family given discharge instructions. Patient/family verbalize understanding of discharge instructions, all questions addressed, copy given to patient/family. PIV removed. No complications noted. Pt transferred to vehicle via wheelchair to be discharged home with family.

## 2024-01-11 NOTE — PROCEDURES
[facility]     Electrophysiology Procedure Note       Date of Procedure: 1/11/2024  Patient's Name: Ray Sauer  YOB: 1950   Medical Record Number: [id number]  Procedure Performed by: Stu Hyde MD    Procedures performed:    External Electrical cardioversion   IV sedation.  Sedation medication was given by physician    An independent trained observer assisted in the monitoring of the patient's level of consciousness and physiological status including vital signs.     Indication of the procedure: Persistent atrial fibrillation     Details of procedure:   The patient was brought to the cath lab area in a fasting and non-sedated state. The risks, benefits and alternatives of the procedure were discussed with the patient. The patient opted to proceed with the procedure. Written informed consent was signed and placed in the chart.  A timeout protocol was completed to identify the patient and the procedure being performed.     Then we used brevital for sedation. 70 mg Brevital was given by me as one dose, and electrical DC cardioversion was perfomred using 200J, synchronized shock.     Patient was converted to sinus rhythm. The patient tolerated the procedure well and there were no complications.     Conclusion:   Successful external DC cardioversion of atrial fibrillation

## 2024-01-17 RX ORDER — ASPIRIN 81 MG/1
TABLET, COATED ORAL
Qty: 90 TABLET | Refills: 1 | Status: SHIPPED | OUTPATIENT
Start: 2024-01-17

## 2024-01-23 ENCOUNTER — TELEPHONE (OUTPATIENT)
Dept: CARDIOLOGY CLINIC | Age: 74
End: 2024-01-23

## 2024-01-23 NOTE — TELEPHONE ENCOUNTER
Clarified medication with wife  Previously on toprol xl 50 mg daily. Sent correct script to pharmacy of lopressor 25 mg BID.

## 2024-01-23 NOTE — TELEPHONE ENCOUNTER
Pt wife called there is some confusion on the metoprolol tartrate (LOPRESSOR) 25 MG tablet, take 1 tablet Bid. Ms Cristiane stated they thinking pt was on the 50 mg in the past.     Pls advise pt needs clarification on metoprolol    Thank you     Pt will also need metoprolol call into pharmacy,   [id number] - [place], [place] - [address] - P [phone] - F [phone]  [address] [place] [address]  Phone: [phone]  Fax: [phone]

## 2024-01-23 NOTE — TELEPHONE ENCOUNTER
Spoke with pt about message below. Informed him based on recent OV notes       Return in about 3 months (around 4/3/2024).  1.Continue metoprolol 25 mg twice per day and Increase cardizem to 120 mg twice per day  2. Start digoxin 125 mcg daily  3. Schedule for cardioversion next week          He voiced concern about the pharmacy having the wrong prescription. I called pharmacy and the current prescription they have is the 50 mg of the Metoprolol.   Please advise

## 2024-01-29 NOTE — PROGRESS NOTES
inadvertent computerized transcription errors may be present.    Lj Bui MD 2/1/2024 8:00 AM    Scribe's attestation: This note was scribed in the presence of Dr. Hao MD, by Raudel Zuniga RN.

## 2024-02-01 ENCOUNTER — OFFICE VISIT (OUTPATIENT)
Dept: CARDIOLOGY CLINIC | Age: 74
End: 2024-02-01
Payer: MEDICARE

## 2024-02-01 VITALS
BODY MASS INDEX: 40.69 KG/M2 | DIASTOLIC BLOOD PRESSURE: 80 MMHG | SYSTOLIC BLOOD PRESSURE: 110 MMHG | HEIGHT: 72 IN | WEIGHT: 300.4 LBS | HEART RATE: 63 BPM | RESPIRATION RATE: 23 BRPM | OXYGEN SATURATION: 94 %

## 2024-02-01 DIAGNOSIS — I25.83 CORONARY ARTERY DISEASE DUE TO LIPID RICH PLAQUE: Primary | ICD-10-CM

## 2024-02-01 DIAGNOSIS — I48.91 ATRIAL FIBRILLATION, UNSPECIFIED TYPE (HCC): ICD-10-CM

## 2024-02-01 DIAGNOSIS — I25.10 CORONARY ARTERY DISEASE DUE TO LIPID RICH PLAQUE: Primary | ICD-10-CM

## 2024-02-01 DIAGNOSIS — I10 HYPERTENSION, UNSPECIFIED TYPE: ICD-10-CM

## 2024-02-01 PROCEDURE — 3074F SYST BP LT 130 MM HG: CPT | Performed by: INTERNAL MEDICINE

## 2024-02-01 PROCEDURE — 3079F DIAST BP 80-89 MM HG: CPT | Performed by: INTERNAL MEDICINE

## 2024-02-01 PROCEDURE — 99204 OFFICE O/P NEW MOD 45 MIN: CPT | Performed by: INTERNAL MEDICINE

## 2024-02-01 PROCEDURE — 1123F ACP DISCUSS/DSCN MKR DOCD: CPT | Performed by: INTERNAL MEDICINE

## 2024-02-01 RX ORDER — ROSUVASTATIN CALCIUM 40 MG/1
40 TABLET, COATED ORAL DAILY
Qty: 90 TABLET | Refills: 3 | Status: SHIPPED | OUTPATIENT
Start: 2024-02-01

## 2024-02-01 NOTE — PATIENT INSTRUCTIONS
Increase rosuvastatin  to 40mg every day.    Call our office if new or worsening symptoms develop.

## 2024-04-01 NOTE — PROGRESS NOTES
[facility]   Electrophysiology Follow up   Date: 4/3/2024  I had the privilege of visiting Ray Sauer in the office.       CC: atrial fibrillation    HPI: Ray Sauer is a 73 y.o. male with a past medical history of HTN, HLD, COPD, AF, CAD, NASIMA, and obesity.    S/p RFCA with PVI, PWI (11/11/22). Patchy scar throughout the left atrium noted during ablation.     01/2024 noted to be back in afib at routine follow up.  S/p DCCV 01/11/2024     Ray presents today three month follow up.  He is feeling well.  He reports no recurrence of atrial fibrillation.  He is very symptomatic's with atrial fibrillation and can tell when he goes into atrial fibrillation.  Patient reports that he cannot use the CPAP machine and has not been able to tolerate it    Assessment and plan:   -Paroxysmal Atrial Fibrillation   - EKG today: Sinus rate 59    - s/p DCCV 01/11/2024   - S/p RFCA with PVI, PWI (11/11/22)     Remains in sinus rhythm.  - Continue metoprolol 25 mg BID.   - Continue cardizem to 120 mg BID   - continue digoxin 125 mcg QD   High risk [id number] score. Anticoagulation is recommended  ~ Continue Eliquis 5 mg BID; tolerating well. Monitor for s/s of bleeding     - Afib risk factors including age, HTN, obesity, inactivity and NASIMA were discussed with patient. Risk factor modification recommended                            We discussed risk factor modification including weight loss.  We also discussed the importance of compliance with CPAP.  Since he cannot tolerate it, will refer to sleep medicine for further options.                            -HTN  Controlled   BP goal <130/80  Continue lopressor, cardizem          -CAD  - Hx of CABG x 3 01/2012  - Stable  - No complaints of angina  - Continue ASA, ACEI, BB, and statin  - follows with Dr. Mobley     -Hyperlipidemia  - Fairly Controlled  ~ Goal: LDL <70               ~ LDL 79  - Continue rosuvastatin 20 mg QD. He c/o intermittent hives, which he

## 2024-04-03 ENCOUNTER — OFFICE VISIT (OUTPATIENT)
Dept: CARDIOLOGY CLINIC | Age: 74
End: 2024-04-03
Payer: MEDICARE

## 2024-04-03 VITALS
SYSTOLIC BLOOD PRESSURE: 126 MMHG | OXYGEN SATURATION: 97 % | WEIGHT: 300.2 LBS | DIASTOLIC BLOOD PRESSURE: 82 MMHG | BODY MASS INDEX: 40.66 KG/M2 | HEIGHT: 72 IN | HEART RATE: 67 BPM

## 2024-04-03 DIAGNOSIS — I48.0 PAF (PAROXYSMAL ATRIAL FIBRILLATION) (HCC): ICD-10-CM

## 2024-04-03 DIAGNOSIS — E66.01 CLASS 3 SEVERE OBESITY WITH SERIOUS COMORBIDITY IN ADULT, UNSPECIFIED BMI, UNSPECIFIED OBESITY TYPE (HCC): ICD-10-CM

## 2024-04-03 DIAGNOSIS — I10 HYPERTENSION, UNSPECIFIED TYPE: ICD-10-CM

## 2024-04-03 DIAGNOSIS — G47.33 OSA (OBSTRUCTIVE SLEEP APNEA): ICD-10-CM

## 2024-04-03 DIAGNOSIS — E78.5 HYPERLIPIDEMIA, UNSPECIFIED HYPERLIPIDEMIA TYPE: ICD-10-CM

## 2024-04-03 DIAGNOSIS — I25.10 CORONARY ARTERY DISEASE INVOLVING NATIVE CORONARY ARTERY OF NATIVE HEART, UNSPECIFIED WHETHER ANGINA PRESENT: Primary | ICD-10-CM

## 2024-04-03 PROCEDURE — G8417 CALC BMI ABV UP PARAM F/U: HCPCS | Performed by: INTERNAL MEDICINE

## 2024-04-03 PROCEDURE — 1036F TOBACCO NON-USER: CPT | Performed by: INTERNAL MEDICINE

## 2024-04-03 PROCEDURE — 3017F COLORECTAL CA SCREEN DOC REV: CPT | Performed by: INTERNAL MEDICINE

## 2024-04-03 PROCEDURE — 1123F ACP DISCUSS/DSCN MKR DOCD: CPT | Performed by: INTERNAL MEDICINE

## 2024-04-03 PROCEDURE — 3074F SYST BP LT 130 MM HG: CPT | Performed by: INTERNAL MEDICINE

## 2024-04-03 PROCEDURE — 93000 ELECTROCARDIOGRAM COMPLETE: CPT | Performed by: INTERNAL MEDICINE

## 2024-04-03 PROCEDURE — 99214 OFFICE O/P EST MOD 30 MIN: CPT | Performed by: INTERNAL MEDICINE

## 2024-04-03 PROCEDURE — 3079F DIAST BP 80-89 MM HG: CPT | Performed by: INTERNAL MEDICINE

## 2024-04-03 PROCEDURE — G8427 DOCREV CUR MEDS BY ELIG CLIN: HCPCS | Performed by: INTERNAL MEDICINE

## 2024-08-01 ENCOUNTER — HOSPITAL ENCOUNTER (OUTPATIENT)
Age: 74
Discharge: HOME | End: 2024-08-01
Payer: MEDICARE

## 2024-08-01 PROCEDURE — 82653 EL-1 FECAL QUANTITATIVE: CPT

## 2024-08-06 LAB — ELASTASE PANC STL-MCNT: >800 UG/G

## 2024-08-30 RX ORDER — DIGOXIN 125 MCG
125 TABLET ORAL DAILY
Qty: 90 TABLET | Refills: 1 | Status: SHIPPED | OUTPATIENT
Start: 2024-08-30

## 2024-08-30 NOTE — TELEPHONE ENCOUNTER
Patient called back to verify that he does want his medication sent to [facility].  The Digoxin was already approved by [employer] after the previous phone call was made.

## 2024-08-30 NOTE — TELEPHONE ENCOUNTER
Received refill request for Digoxin from [facility] pharmacy.    Last ov: 04/03/2024 JOESPH    Last Refill: 01/03/2024 #9 w/ 1    Next appointment: 10/09/2024 JOESPH

## 2024-08-30 NOTE — TELEPHONE ENCOUNTER
LVM for patient to call back because we need to verify is he is using [facility] now?   Or is he just getting the Digoxin filled at [facility]?

## 2024-10-14 NOTE — PROGRESS NOTES
[facility]   Electrophysiology  Office Visit     Date: 10/17/2024  EP Attending: Stu Hyde MD    Chief Complaint:   Chief Complaint   Patient presents with    Atrial Fibrillation     No cardiac symptoms at this time.    Hypertension    Hyperlipidemia    Follow-up     History of Present Illness: History obtained from patient and medical record.    Ray Sauer is 74 y.o. male with a past medical history of HTN, HLD, COPD, PAF, CAD, NASIMA, and obesity.     S/p RFCA with PVI, PWI (11/11/22). Patchy scar throughout the left atrium noted during ablation.      01/2024 noted to be back in afib at routine follow up.  S/p DCCV 01/11/2024     Interval history: Today, Ray Sauer is being seen for route follow up. Patient is doing will from a cardiac prospective. ECG today shows sinus bradycardia. He has been working on losing weight, down about 40 lbs. He is very active on a daily basis. 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     Denies having chest pain, palpitations, shortness of breath, orthopnea/PND, cough, or dizziness at the time of this visit.    With regard to medication therapy the patient has been compliant with prescribed regimen. They have tolerated therapy to date.     Assessment and Plan.    Paroxsymal atrial fibrillation   -ECG Telemetry today shows sinus bradycardia, rate 55  -[id number] Score is 4( Age > 65,HTN,Vascular Disease,Sex), He is high risk for thromboembolic event. Anticoagulation is recommended- Continue Eliquis 5 mg BID  - Continue metoprolol 25 mg BID, Cardizem 120 mg daily and Digoxin 125 mcg daily     CAD  -S/p CABG x3 1/2012  -Asa, statin  - Follows with Dr. Mobley     HTN  -Controlled   -Goal < 130/80  -Continue Lisinopril-hydrochlorothiazide 20-12.5, Lopressor 25 mg BID  -Encouraged blood pressure monitoring and logging at home  -Discussed importance of low sodium diet, weight control and exercise    NASIMA  -Unable to tolerate 
Detail Level: Detailed

## 2024-10-15 ENCOUNTER — OFFICE VISIT (OUTPATIENT)
Dept: CARDIOLOGY CLINIC | Age: 74
End: 2024-10-15

## 2024-10-15 VITALS
OXYGEN SATURATION: 98 % | BODY MASS INDEX: 35.41 KG/M2 | DIASTOLIC BLOOD PRESSURE: 70 MMHG | SYSTOLIC BLOOD PRESSURE: 136 MMHG | HEART RATE: 58 BPM | WEIGHT: 261.4 LBS | HEIGHT: 72 IN

## 2024-10-15 DIAGNOSIS — I48.0 PAF (PAROXYSMAL ATRIAL FIBRILLATION) (HCC): Primary | ICD-10-CM

## 2024-10-15 DIAGNOSIS — I48.19 PERSISTENT ATRIAL FIBRILLATION (HCC): ICD-10-CM

## 2024-10-15 DIAGNOSIS — I10 HYPERTENSION, UNSPECIFIED TYPE: ICD-10-CM

## 2024-10-15 RX ORDER — MAGNESIUM GLUCONATE 27 MG(500)
500 TABLET ORAL 2 TIMES DAILY
COMMUNITY

## 2025-01-24 RX ORDER — METOPROLOL TARTRATE 25 MG/1
25 TABLET, FILM COATED ORAL 2 TIMES DAILY
Qty: 180 TABLET | Refills: 3 | Status: SHIPPED | OUTPATIENT
Start: 2025-01-24

## 2025-01-24 NOTE — TELEPHONE ENCOUNTER
Requested Prescriptions     Pending Prescriptions Disp Refills    metoprolol tartrate (LOPRESSOR) 25 MG tablet [Pharmacy Med Name: METOPROLOL TARTRATE 25 MG TAB] 180 tablet 3     Sig: TAKE 1 TABLET BY MOUTH TWICE A DAY      Last OV:  10/15/2024 NPRJ    Next OV: 2/18/2025 ADM    Last EKG: 10/15/2024    Last Filled: 01/23/2024 NPSR

## 2025-02-04 NOTE — PROGRESS NOTES
[facility]  Cardiology Note  [phone]      Chief Complaint   Patient presents with    Follow-up     1 year. SOB, feels like he is AFIB about a month ago, dizzy. Went to ER elevated blood glucose. Feels like when his sugar is elevated it puts him in AFIB.     Atrial Fibrillation    Hypertension    Coronary Artery Disease    Hyperlipidemia      History of Present Illness:  Ray Sauer is a 74 y.o. patient who presents to our office for cardiac evaluation. His history includes CAD/CABG, HTN, HLD, PAF. He is a former smoker. He has seen Dr. Bennett in the past.    He was cardioverted 1/11/24 (Dr. Hyde).    02/01/24 OV w/ me: Today, he states that overall he feels fairly well. He has more energy since his cardioversion.  He c/o mild SOB at times, not sure when it occurs exactly. denies exertional chest pain, PND, palpitations, light-headedness, edema. He was changed from atorvastatin to rosuvastatin for a questionable rash but he believes there was another causative factor. He is \"pretty active\" and will stop to rest if he needs to. He doers have some hand cramps at times. 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     Today, patient is here for 1 year follow up. He believes he's been in afib for the last 4-5 days. Confirmed by EKG today.    He has been struggling with hyperglycemia, 300-500's. He's been very cautious about what he has been eating, currently on metformin and glimepiride. He's lost about 60 lbs. Has not seen an endocrinologist.     He complains of cold feet and dry cracked heels, does not follow with podiatrist.     Patient ate breakfast at 8 am today. I consulted with nayeli Espinoza to keep NPO for 4 hours prior to cardioversion. Patient reports he has taken all doses of his medications, including Eliquis.     Past Medical History:   has a past medical history of CAD (coronary artery

## 2025-02-18 ENCOUNTER — HOSPITAL ENCOUNTER (OUTPATIENT)
Age: 75
Discharge: HOME | End: 2025-02-20
Attending: INTERNAL MEDICINE
Payer: MEDICARE

## 2025-02-18 ENCOUNTER — OFFICE VISIT (OUTPATIENT)
Dept: CARDIOLOGY CLINIC | Age: 75
End: 2025-02-18
Payer: MEDICARE

## 2025-02-18 VITALS
HEIGHT: 72 IN | TEMPERATURE: 98.1 F | WEIGHT: 258 LBS | BODY MASS INDEX: 34.95 KG/M2 | OXYGEN SATURATION: 100 % | DIASTOLIC BLOOD PRESSURE: 90 MMHG | RESPIRATION RATE: 15 BRPM | SYSTOLIC BLOOD PRESSURE: 118 MMHG | HEART RATE: 71 BPM

## 2025-02-18 VITALS
HEART RATE: 106 BPM | BODY MASS INDEX: 34.99 KG/M2 | WEIGHT: 258 LBS | OXYGEN SATURATION: 96 % | DIASTOLIC BLOOD PRESSURE: 70 MMHG | SYSTOLIC BLOOD PRESSURE: 112 MMHG

## 2025-02-18 DIAGNOSIS — I48.0 PAROXYSMAL ATRIAL FIBRILLATION (HCC): Primary | ICD-10-CM

## 2025-02-18 DIAGNOSIS — I48.91 ATRIAL FIBRILLATION, UNSPECIFIED TYPE (HCC): ICD-10-CM

## 2025-02-18 DIAGNOSIS — I48.0 PAROXYSMAL ATRIAL FIBRILLATION (HCC): ICD-10-CM

## 2025-02-18 DIAGNOSIS — I25.810 CORONARY ARTERY DISEASE INVOLVING CORONARY BYPASS GRAFT OF NATIVE HEART WITHOUT ANGINA PECTORIS: Primary | ICD-10-CM

## 2025-02-18 DIAGNOSIS — R73.9 HYPERGLYCEMIA: ICD-10-CM

## 2025-02-18 DIAGNOSIS — I10 HYPERTENSION, UNSPECIFIED TYPE: ICD-10-CM

## 2025-02-18 DIAGNOSIS — Z95.1 HISTORY OF CORONARY ARTERY BYPASS GRAFT: ICD-10-CM

## 2025-02-18 PROCEDURE — 1159F MED LIST DOCD IN RCRD: CPT | Performed by: INTERNAL MEDICINE

## 2025-02-18 PROCEDURE — G8427 DOCREV CUR MEDS BY ELIG CLIN: HCPCS | Performed by: INTERNAL MEDICINE

## 2025-02-18 PROCEDURE — 92960 CARDIOVERSION ELECTRIC EXT: CPT | Performed by: INTERNAL MEDICINE

## 2025-02-18 PROCEDURE — 2500000003 HC RX 250 WO HCPCS: Performed by: INTERNAL MEDICINE

## 2025-02-18 PROCEDURE — 7100000010 HC PHASE II RECOVERY - FIRST 15 MIN: Performed by: INTERNAL MEDICINE

## 2025-02-18 PROCEDURE — 1036F TOBACCO NON-USER: CPT | Performed by: INTERNAL MEDICINE

## 2025-02-18 PROCEDURE — 93005 ELECTROCARDIOGRAM TRACING: CPT

## 2025-02-18 PROCEDURE — 93000 ELECTROCARDIOGRAM COMPLETE: CPT | Performed by: INTERNAL MEDICINE

## 2025-02-18 PROCEDURE — 3017F COLORECTAL CA SCREEN DOC REV: CPT | Performed by: INTERNAL MEDICINE

## 2025-02-18 PROCEDURE — 7100000011 HC PHASE II RECOVERY - ADDTL 15 MIN: Performed by: INTERNAL MEDICINE

## 2025-02-18 PROCEDURE — G8417 CALC BMI ABV UP PARAM F/U: HCPCS | Performed by: INTERNAL MEDICINE

## 2025-02-18 PROCEDURE — 92960 CARDIOVERSION ELECTRIC EXT: CPT

## 2025-02-18 PROCEDURE — G2211 COMPLEX E/M VISIT ADD ON: HCPCS | Performed by: INTERNAL MEDICINE

## 2025-02-18 PROCEDURE — 1123F ACP DISCUSS/DSCN MKR DOCD: CPT | Performed by: INTERNAL MEDICINE

## 2025-02-18 PROCEDURE — 3074F SYST BP LT 130 MM HG: CPT | Performed by: INTERNAL MEDICINE

## 2025-02-18 PROCEDURE — 3078F DIAST BP <80 MM HG: CPT | Performed by: INTERNAL MEDICINE

## 2025-02-18 PROCEDURE — 99215 OFFICE O/P EST HI 40 MIN: CPT | Performed by: INTERNAL MEDICINE

## 2025-02-18 RX ORDER — FINASTERIDE 5 MG/1
5 TABLET, FILM COATED ORAL DAILY
COMMUNITY

## 2025-02-18 RX ORDER — BUDESONIDE 3 MG/1
9 CAPSULE, COATED PELLETS ORAL DAILY
COMMUNITY
Start: 2024-07-29

## 2025-02-18 RX ORDER — GLIMEPIRIDE 4 MG/1
4 TABLET ORAL
COMMUNITY

## 2025-02-18 RX ADMIN — METHOHEXITAL SODIUM 70 MG: 500 INJECTION, POWDER, LYOPHILIZED, FOR SOLUTION INTRAMUSCULAR; INTRAVENOUS; RECTAL at 12:26

## 2025-02-18 NOTE — PROGRESS NOTES
You are scheduled for a cardioversion.    Our  will call you to discuss a date for your procedure.    The day of your procedure you will need to check in at the Registration Desk in the Main Lobby.    PRE-PROCEDURE INSTRUCTIONS   Do not eat or drink anything after midnight the night before your procedure.   Take all your medications with a sip of water in the morning.   Do not hold your Eliquis    Please have a responsible adult to drive you home after your procedure.    If you have any questions regarding your procedure itself or your medications, please call [phone] and ask to speak to an EP nurse.

## 2025-02-18 NOTE — PROCEDURES
Caller: Sidney Pantoja    Relationship: Self    Best call back number:[phone]    Do you know the name of the person who called: SERGEY    What was the call regarding: TEST RESULTS    Do you require a callback: YES    ATTEMPTED WARM TRANSFER, NO ANSWER         [facility]     Electrophysiology Procedure Note       Date of Procedure: 2/18/2025  Patient's Name: Ray Sauer  YOB: 1950   Medical Record Number: [id number]  Procedure Performed by: Stu Hyde MD    Procedures performed:    External Electrical cardioversion   IV sedation.  Medication: Brevital Sodium   Sedation medication was given by physician    An independent trained observer assisted in the monitoring of the patient's level of consciousness and physiological status including vital signs.     Indication of the procedure: Persistent atrial fibrillation     Details of procedure:   The patient was brought to the cath lab area in a fasting and non-sedated state. The risks, benefits and alternatives of the procedure were discussed with the patient. The patient opted to proceed with the procedure. Written informed consent was signed and placed in the chart.  A timeout protocol was completed to identify the patient and the procedure being performed.     Then we used brevital for sedation. 70 mg Brevital was given by me as one dose, and electrical DC cardioversion was perfomred using 200J, synchronized shock.     Patient was converted to sinus rhythm. The patient tolerated the procedure well and there were no complications.     Conclusion:   Successful external DC cardioversion of atrial fibrillation

## 2025-02-18 NOTE — H&P
[facility]   Electrophysiology Follow up   Date: 2/18/2025  I had the privilege of visiting Ray Sauer in the office.       CC: atrial fibrillation    HPI: Ray Sauer is a 74 y.o. male with a past medical history of HTN, HLD, COPD, AF, CAD, NASIMA, and obesity.    S/p RFCA with PVI, PWI (11/11/22). Patchy scar throughout the left atrium noted during ablation.     01/2024 noted to be back in afib at routine follow up.  S/p DCCV 01/11/2024     Ray presents today for cardioversion.   He has been in afib for about one week.     Assessment and plan:   -Persistent Atrial Fibrillation   - EKG today: Sinus rate 59    - s/p DCCV 01/11/2024   - S/p RFCA with PVI, PWI (11/11/22)     Remains in sinus rhythm.  - Continue metoprolol 25 mg BID.   - Continue cardizem to 120 mg BID   - continue digoxin 125 mcg QD   High risk [id number] score. Anticoagulation is recommended  ~ Continue Eliquis 5 mg BID; tolerating well. Monitor for s/s of bleeding     - Afib risk factors including age, HTN, obesity, inactivity and NASIMA were discussed with patient. Risk factor modification recommended                            We discussed risk factor modification including weight loss.  We also discussed the importance of compliance with CPAP.  Since he cannot tolerate it, will refer to sleep medicine for further options.                             He is here for cardioversion today.    Risks, benefits and alternative of procedure were explained. All questions answered. Patient understood and would like to proceed.       -HTN  Controlled   BP goal <130/80  Continue lopressor, cardizem          -CAD  - Hx of CABG x 3 01/2012  - Stable  - No complaints of angina  - Continue ASA, ACEI, BB, and statin  - follows with Dr. Mobley     -Hyperlipidemia  - Fairly Controlled  ~ Goal: LDL <70               ~ LDL 79  - Continue rosuvastatin 20 mg QD. He c/o intermittent hives, which he  believes stems from the statin. Will consider

## 2025-02-18 NOTE — PATIENT INSTRUCTIONS
Nothing to eat or drink today    Arrive to cath lab @ 12:30, plan for cardioversion with Dr. Peraza @ 1pm    Referred to endocrinologist    Follow up with Dr. Bui 1 year    Please call the office [phone], or send a message through [employer] with any worsening symptoms, concerns or questions.

## 2025-02-18 NOTE — DISCHARGE INSTRUCTIONS
CARDIOVERSION DISCHARGE INSTRUCTIONS    No driving for 24 hours. We strongly recommend that a responsible adult stay with you for the next 24 hours.    Continue Eliquis..    Hydrocortisone 1% cream to reddened areas as needed.

## 2025-02-19 LAB
EKG ATRIAL RATE: 66 BPM
EKG DIAGNOSIS: NORMAL
EKG P AXIS: 25 DEGREES
EKG P-R INTERVAL: 186 MS
EKG Q-T INTERVAL: 384 MS
EKG QRS DURATION: 104 MS
EKG QTC CALCULATION (BAZETT): 402 MS
EKG R AXIS: -32 DEGREES
EKG T AXIS: 54 DEGREES
EKG VENTRICULAR RATE: 66 BPM

## 2025-02-24 RX ORDER — DIGOXIN 125 MCG
125 TABLET ORAL DAILY
Qty: 90 TABLET | Refills: 1 | Status: SHIPPED | OUTPATIENT
Start: 2025-02-24

## 2025-02-24 NOTE — TELEPHONE ENCOUNTER
Received refill request for digoxin (LANOXIN) 125 MCG tablet  from [facility] pharmacy.     Last OV: 2/18/2025 ADM    Next OV: 3/10/2025 NPRJ    Last Labs: 2/18/2025 EKG    Last Filled: 8/30/2024 NPSR

## 2025-03-03 ENCOUNTER — TELEPHONE (OUTPATIENT)
Dept: CARDIOLOGY CLINIC | Age: 75
End: 2025-03-03

## 2025-03-03 DIAGNOSIS — E11.65 UNCONTROLLED TYPE 2 DIABETES MELLITUS WITH HYPERGLYCEMIA (HCC): Primary | ICD-10-CM

## 2025-03-03 NOTE — TELEPHONE ENCOUNTER
Reviewed chart referral placed to Endo but on referral per endo they only see patients with DX of type 1 or 2 for diabetes. Referral has Hyperglycemia as the dx code. Looks like OV visit with PCP on 1//16/2025 has  dx code of type 2. Okay to update referral?

## 2025-03-03 NOTE — TELEPHONE ENCOUNTER
Patient was seen by ADM on 2/18/25 and was told that a referral for a diabetes specialist was going to be placed for him, but he states he has not heard anything, please call to advise who he is supposed to see  [phone].

## 2025-03-25 ENCOUNTER — OFFICE VISIT (OUTPATIENT)
Age: 75
End: 2025-03-25
Payer: MEDICARE

## 2025-03-25 VITALS
SYSTOLIC BLOOD PRESSURE: 118 MMHG | HEIGHT: 72 IN | HEART RATE: 62 BPM | BODY MASS INDEX: 34.92 KG/M2 | OXYGEN SATURATION: 98 % | TEMPERATURE: 98 F | WEIGHT: 257.8 LBS | RESPIRATION RATE: 16 BRPM | DIASTOLIC BLOOD PRESSURE: 73 MMHG

## 2025-03-25 DIAGNOSIS — E11.00 TYPE 2 DIABETES MELLITUS WITH HYPEROSMOLARITY WITHOUT COMA, WITHOUT LONG-TERM CURRENT USE OF INSULIN (HCC): Primary | ICD-10-CM

## 2025-03-25 PROCEDURE — 1123F ACP DISCUSS/DSCN MKR DOCD: CPT | Performed by: HOSPITALIST

## 2025-03-25 PROCEDURE — 1159F MED LIST DOCD IN RCRD: CPT | Performed by: HOSPITALIST

## 2025-03-25 PROCEDURE — 1160F RVW MEDS BY RX/DR IN RCRD: CPT | Performed by: HOSPITALIST

## 2025-03-25 PROCEDURE — 3074F SYST BP LT 130 MM HG: CPT | Performed by: HOSPITALIST

## 2025-03-25 PROCEDURE — G8427 DOCREV CUR MEDS BY ELIG CLIN: HCPCS | Performed by: HOSPITALIST

## 2025-03-25 PROCEDURE — 2022F DILAT RTA XM EVC RTNOPTHY: CPT | Performed by: HOSPITALIST

## 2025-03-25 PROCEDURE — G8417 CALC BMI ABV UP PARAM F/U: HCPCS | Performed by: HOSPITALIST

## 2025-03-25 PROCEDURE — 3017F COLORECTAL CA SCREEN DOC REV: CPT | Performed by: HOSPITALIST

## 2025-03-25 PROCEDURE — 1036F TOBACCO NON-USER: CPT | Performed by: HOSPITALIST

## 2025-03-25 PROCEDURE — 3046F HEMOGLOBIN A1C LEVEL >9.0%: CPT | Performed by: HOSPITALIST

## 2025-03-25 PROCEDURE — 3078F DIAST BP <80 MM HG: CPT | Performed by: HOSPITALIST

## 2025-03-25 PROCEDURE — 99205 OFFICE O/P NEW HI 60 MIN: CPT | Performed by: HOSPITALIST

## 2025-03-25 NOTE — PROGRESS NOTES
Ray Sauer is a 74 y.o. male, presenting for evaluation of the following:   Preferred name is Eliel.    Diabetes mellitus type 2: A1c 7.1 % Feb/2025 < 12.3% Oct/2024 < 8.7% April/2024  CT abdomen/pelvis without contrast in May 2022 showed pancreas to be unremarkable.  Date of diagnosis: Oct 2024.    Current regimen: At home on metformin 1000 mg twice daily and glimepiride 4 mg daily    Patient denies any issues with pancreatitis, pancreatic cancer, personal or family history of thyroid cancer, recurrent UTIs or genital infections, amputations, DKA etc.    Intolerance to diabetes medications: Yes  Mounjaro- used it lose weight- caused him to have lower GIB.    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  Denies any episodes of falls or loss of consciousness related to hypoglycemia  he does not have glucose tablets at home-advised to get them from pharmacy.    Does patient have Glucagon emergency kit?  No-prescribed      Food intake: 1 meal at night  Eats eggs and jerky early morning  Tries to eat protein mostly    Weight trend: Trying to lose weight, has lost 80 lbs in past year intentionally- tries to watch his diet. Stopped soft drinks and ice-cream  Prior visit with dietician: No-referral placed    Comorbid conditions: Has paroxysmal A-fib on anticoagulation: Follows with EP, NASIMA unable to tolerate CPAP    Diabetic complications: Has CAD status post CABG x 3 in 2012: Follows with cardiology  Has CKD 3 and Neuropathy in feet  Creatinine 1.55 with GFR 48 in May 2024  Denies CVA, PAD (amputations, foot ulcers)  he denies retinopathy      Eye exam: Had an eye checkup which was done Oct/2024 and he does not have retinopathy.      A1c   Date Value Ref Range Status   01/11/2012 6.0 4.0 - 6.0 Final   01/10/2012 5.9 4.0 -

## 2025-03-25 NOTE — PATIENT INSTRUCTIONS
-Decrease metformin 500 mg twice daily    -Start on Jardiance 10 mg daily    -Stop glimepiride     -Will check lipid profile today    -Recommend to check blood sugars 1-2 times daily, fasting, before meals and at bedtime  Please call us if any issues with hypo-/hyperglycemia.    -Placed a referral for diabetes education    -Follow up in 6 weeks

## 2025-03-27 ENCOUNTER — TELEPHONE (OUTPATIENT)
Age: 75
End: 2025-03-27

## 2025-03-27 NOTE — TELEPHONE ENCOUNTER
Fax received today from [employer] that glucagon is not covered under formulary. A covered alternative is Zegalogue. Please advise.

## 2025-03-28 NOTE — PROGRESS NOTES
[facility]   Electrophysiology  Office Visit     Date: 4/4/2025  EP Attending: Stu Hyde MD    Chief Complaint:   Chief Complaint   Patient presents with    Follow-up     No c/c     History of Present Illness: History obtained from patient and medical record.    Ray Sauer is 74 y.o. male with a past medical history of HTN, HLD, COPD, PAF, CAD, NASIMA, and obesity.     S/p RFCA with PVI, PWI (11/11/22). Patchy scar throughout the left atrium noted during ablation.      01/2024 noted to be back in afib at routine follow up.  S/p DCCV 01/11/2024     S/p DCCV 2/18/2025    Interval history: Today, Ray Sauer is being seen for routine follow up. S/p successful DCCV 2/18/2025. He is going well from a cardiac standpoint. Denies recurrence of atrial fibrillation since cardioversion. He has been compliant with medications and is tolerating them to date. States he is having issue controlling his blood sugars.     Denies having chest pain, palpitations, shortness of breath, orthopnea/PND, cough, or dizziness at the time of this visit.    Assessment and Plan    Paroxsymal atrial fibrillation   -S/p DCCV 2/18/2025  ECG Telemetry today shows SB  -[id number] Score is 4( Age > 65,HTN,Vascular Disease,Sex), He is high risk for thromboembolic event. Anticoagulation is recommended- Continue Eliquis 5 mg BID  - Continue metoprolol 25 mg BID, Cardizem 120 mg daily and Digoxin 125 mcg daily     CAD  -S/p CABG x3 1/2012  -Asa, statin  - Follows with Dr. Mobley     HTN  -Controlled   -Goal < 130/80  -Continue Lisinopril-hydrochlorothiazide 20-12.5, Lopressor 25 mg BID  -Encouraged blood pressure monitoring and logging at home  -Discussed importance of low sodium diet, weight control and exercise    NASIMA  -Unable to tolerate Cpap  -Discussed the use of Cpap to prevent long term effects associated with untreated obstructive sleep apnea     5. DM   -Fairly controlled    -Last A1c 6, Goal < 7   -Continue medical management

## 2025-04-04 ENCOUNTER — OFFICE VISIT (OUTPATIENT)
Dept: CARDIOLOGY CLINIC | Age: 75
End: 2025-04-04

## 2025-04-04 VITALS
WEIGHT: 251 LBS | HEIGHT: 72 IN | BODY MASS INDEX: 34 KG/M2 | HEART RATE: 61 BPM | SYSTOLIC BLOOD PRESSURE: 116 MMHG | OXYGEN SATURATION: 97 % | DIASTOLIC BLOOD PRESSURE: 78 MMHG

## 2025-04-04 DIAGNOSIS — I25.810 CORONARY ARTERY DISEASE INVOLVING CORONARY BYPASS GRAFT OF NATIVE HEART WITHOUT ANGINA PECTORIS: ICD-10-CM

## 2025-04-04 DIAGNOSIS — I48.19 PERSISTENT ATRIAL FIBRILLATION (HCC): ICD-10-CM

## 2025-04-04 DIAGNOSIS — I25.10 CORONARY ARTERY DISEASE INVOLVING NATIVE CORONARY ARTERY OF NATIVE HEART, UNSPECIFIED WHETHER ANGINA PRESENT: ICD-10-CM

## 2025-04-04 DIAGNOSIS — I48.91 ATRIAL FIBRILLATION, UNSPECIFIED TYPE (HCC): ICD-10-CM

## 2025-04-04 DIAGNOSIS — I48.0 PAROXYSMAL ATRIAL FIBRILLATION (HCC): Primary | ICD-10-CM

## 2025-04-04 DIAGNOSIS — Z95.1 HISTORY OF CORONARY ARTERY BYPASS GRAFT: ICD-10-CM

## 2025-04-04 DIAGNOSIS — I48.0 PAF (PAROXYSMAL ATRIAL FIBRILLATION) (HCC): ICD-10-CM

## 2025-04-04 DIAGNOSIS — I10 HYPERTENSION, UNSPECIFIED TYPE: ICD-10-CM

## 2025-04-09 ENCOUNTER — TELEPHONE (OUTPATIENT)
Age: 75
End: 2025-04-09

## 2025-04-09 NOTE — TELEPHONE ENCOUNTER
Pt called in and said BS has been 150-350. Pt denies  being sick or starting any new medications. Pt lost 80lbs and trying to be better. Pt is dizzy. Please advise.

## 2025-04-09 NOTE — TELEPHONE ENCOUNTER
Called and spoke with pt regarding blood sugars. Please advise on B/S readings below.    4/9-191 this morning  4/8-285 evening  4/7-244 evening   4/6-281 evening  4/5-338 evening  4/4-199 evening

## 2025-04-09 NOTE — TELEPHONE ENCOUNTER
Can he come and see me tomorrow at 1:20 PM at [place]  In the interim if he starts feeling sick he needs to go to the ER for further evaluation  Thank you  Yadi Mccoy MD

## 2025-04-10 ENCOUNTER — OFFICE VISIT (OUTPATIENT)
Dept: ENDOCRINOLOGY | Age: 75
End: 2025-04-10
Payer: MEDICARE

## 2025-04-10 VITALS
DIASTOLIC BLOOD PRESSURE: 68 MMHG | WEIGHT: 253 LBS | OXYGEN SATURATION: 98 % | BODY MASS INDEX: 34.31 KG/M2 | SYSTOLIC BLOOD PRESSURE: 112 MMHG | HEART RATE: 63 BPM

## 2025-04-10 DIAGNOSIS — E11.00 TYPE 2 DIABETES MELLITUS WITH HYPEROSMOLARITY WITHOUT COMA, WITHOUT LONG-TERM CURRENT USE OF INSULIN (HCC): ICD-10-CM

## 2025-04-10 LAB
CHOLEST SERPL-MCNC: 173 MG/DL (ref 0–199)
HDLC SERPL-MCNC: 76 MG/DL (ref 40–60)
LDLC SERPL CALC-MCNC: 69 MG/DL
TRIGL SERPL-MCNC: 138 MG/DL (ref 0–150)
VLDLC SERPL CALC-MCNC: 28 MG/DL

## 2025-04-10 PROCEDURE — 3078F DIAST BP <80 MM HG: CPT | Performed by: HOSPITALIST

## 2025-04-10 PROCEDURE — 2022F DILAT RTA XM EVC RTNOPTHY: CPT | Performed by: HOSPITALIST

## 2025-04-10 PROCEDURE — 1159F MED LIST DOCD IN RCRD: CPT | Performed by: HOSPITALIST

## 2025-04-10 PROCEDURE — 1160F RVW MEDS BY RX/DR IN RCRD: CPT | Performed by: HOSPITALIST

## 2025-04-10 PROCEDURE — G8417 CALC BMI ABV UP PARAM F/U: HCPCS | Performed by: HOSPITALIST

## 2025-04-10 PROCEDURE — 1036F TOBACCO NON-USER: CPT | Performed by: HOSPITALIST

## 2025-04-10 PROCEDURE — 3017F COLORECTAL CA SCREEN DOC REV: CPT | Performed by: HOSPITALIST

## 2025-04-10 PROCEDURE — 3046F HEMOGLOBIN A1C LEVEL >9.0%: CPT | Performed by: HOSPITALIST

## 2025-04-10 PROCEDURE — 1123F ACP DISCUSS/DSCN MKR DOCD: CPT | Performed by: HOSPITALIST

## 2025-04-10 PROCEDURE — 99214 OFFICE O/P EST MOD 30 MIN: CPT | Performed by: HOSPITALIST

## 2025-04-10 PROCEDURE — 3074F SYST BP LT 130 MM HG: CPT | Performed by: HOSPITALIST

## 2025-04-10 PROCEDURE — G8427 DOCREV CUR MEDS BY ELIG CLIN: HCPCS | Performed by: HOSPITALIST

## 2025-04-10 NOTE — PATIENT INSTRUCTIONS
-Continue metformin 500 mg twice daily    -Continue Jardiance 10 mg daily    -Will start on sitagliptin 50 mg daily    -Will check lipid profile today    -Recommend to check blood sugars 1-2 times daily, fasting, before meals and at bedtime  Please call us if any issues with hypo-/hyperglycemia.    -Placed a referral for diabetes education    -Follow up in 4 weeks,  has an appointment on May 6th

## 2025-04-10 NOTE — PROGRESS NOTES
25 11/11/2022 09:30 AM    BUN 26 11/11/2022 09:30 AM    CREATININE 1.1 11/11/2022 09:30 AM    GLUCOSE 128 11/11/2022 09:30 AM    CALCIUM 9.9 11/11/2022 09:30 AM    BILITOT 0.6 11/11/2022 09:30 AM    ALKPHOS 77 11/11/2022 09:30 AM    AST 17 11/11/2022 09:30 AM    ALT 10 11/11/2022 09:30 AM    LABGLOM >60 11/11/2022 09:30 AM    GFRAA >60 01/15/2012 05:45 AM    AGRATIO 1.3 11/11/2022 09:30 AM     No results found for: \"TSHFT4\", \"TSH\"  Lab Results   Component Value Date/Time    LABA1C 6.0 01/11/2012 08:40 AM     Lab Results   Component Value Date/Time    TRIG 85 01/10/2012 04:00 PM     Lab Results   Component Value Date/Time    HDL 44 01/10/2012 04:00 PM     No components found for: \"LDLCHOLESTEROL\", \"LDLCALC\"  No components found for: \"LABMICR\", \"[id number]\"  No results found for: \"VITD25\"     Review of Systems:  As noted in HPI    /68   Pulse 63   Wt 114.8 kg (253 lb)   SpO2 98%   BMI 34.31 kg/m²    Wt Readings from Last 3 Encounters:   04/10/25 114.8 kg (253 lb)   04/04/25 113.9 kg (251 lb)   03/25/25 116.9 kg (257 lb 12.8 oz)     Body mass index is 34.31 kg/m².      OBJECTIVE:  GENERAL: Patient awake, alert and answering questions appropriately  NEURO: Grossly normal.  HEENT: No pallor or icterus.  Oral  mucosa appears to be normal.  No nasal discharge.  NECK: Supple.  CHEST: Bilateral breath sounds present equally, no wheezing present  HEART: S1-S2 present.  Regular rate and rhythm  ABDOMEN: Soft, nontender and nondistended.    EXTREMITIES: No lower extremity edema.  No foot ulcers reported             ASSESSMENT/PLAN:  Assessment & Plan  Type 2 diabetes mellitus with hyperosmolarity without coma, without long-term current use of insulin ([facility])       Orders:    Lipid Panel; Future         Patient is here for follow-up of diabetes mellitus type 2.  It appears to be fairly controlled with A1c of 7.1% recently.  Patient does admit to taking his medications daily without missing any doses.  Discussed in detail

## 2025-04-11 ENCOUNTER — RESULTS FOLLOW-UP (OUTPATIENT)
Dept: ENDOCRINOLOGY | Age: 75
End: 2025-04-11

## 2025-04-11 NOTE — RESULT ENCOUNTER NOTE
Anni Ford,  Can you please send the below message to the patient in a letter?    Thank you  MD Anni Toribio,  Your LDL is within limits at <70.  Hence recommend that you continue to take Crestor and WelChol as prescribed.  Thank you  Yadi Mccoy MD

## 2025-05-02 ENCOUNTER — OFFICE VISIT (OUTPATIENT)
Dept: ENDOCRINOLOGY | Age: 75
End: 2025-05-02
Payer: MEDICARE

## 2025-05-02 DIAGNOSIS — E11.00 TYPE 2 DIABETES MELLITUS WITH HYPEROSMOLARITY WITHOUT COMA, WITHOUT LONG-TERM CURRENT USE OF INSULIN (HCC): Primary | ICD-10-CM

## 2025-05-02 PROCEDURE — 3046F HEMOGLOBIN A1C LEVEL >9.0%: CPT

## 2025-05-02 PROCEDURE — 97802 MEDICAL NUTRITION INDIV IN: CPT

## 2025-05-02 PROCEDURE — G8427 DOCREV CUR MEDS BY ELIG CLIN: HCPCS

## 2025-05-02 PROCEDURE — G8417 CALC BMI ABV UP PARAM F/U: HCPCS

## 2025-05-02 NOTE — PROGRESS NOTES
Medical Nutrition Therapy for Diabetes  [facility]    Ray Sauer  May 2, 2025    Patient Care Team:  José Miguel Adamson MD as PCP - General (General Practice)    Reason for visit: 1. Type 2 diabetes mellitus with hyperosmolarity without coma, without long-term current use of insulin (HCC)     Initial     ASSESSMENT/PLAN:   NUTRITION DIAGNOSIS    #1 Problem: Altered Nutrition-Related Laboratory Values (NC-2.2)  Related to: Endocrine/Diabetes   As Evidenced by: Elevated Plasma glucose and/or HgbA1c levels         #2 Problem: Knowledge and Beliefs-NB-3.1                       Food and nutrition deficits    #3 Problem: Inconsistent Carbohydrate Intake (NI 5.8.4)  Related to: Varied meal timing / incorrect carbohydrate counting  As Evidenced by: fluctuation in blood glucose levels / food recall    NUTRITION INTERVENTION  Nutrition Prescription: 45 grams carbohydrate per meal with protein and non-starch vegetables  15 gram carbohydrate snacks     Diabetes Education/Counseling included:  Diabetes disease process:  Explained HgbA1c ranges, reviewed normal/at risk for diabetes/and diabetes diagnosis ranges.  Nutrition Management  Carbohydrate control-ADA plate method for pairing carbohydrate and non-carbohydrate foods  Impacts of fiber, fats, and proteins on blood glucose trending   Benefit of eating protein with each meal/snack reviewed  Label reading  Activity/Exercise  Monitoring: Glucometer / CGM / Does not test  Medication Review:  Reviewed proper medication timing  Benefits of adequate hydration, quality sleep and stress management  Healthy coping/Behavior change:       Handouts Provided:  Planning Healthy Meals- [employer]  Pathophysiology of Diabetes reviewed  Low carb snack ideas list- [facility]    Interventions:  Control Carbohydrate Intake using Plate Guide  Control Carbohydrate Intake using Carb Counting  Increase intake of whole grains  Decrease intake of high sodium foods  Discussed

## 2025-05-06 ENCOUNTER — TELEPHONE (OUTPATIENT)
Dept: CARDIOLOGY CLINIC | Age: 75
End: 2025-05-06

## 2025-05-06 ENCOUNTER — OFFICE VISIT (OUTPATIENT)
Age: 75
End: 2025-05-06
Payer: MEDICARE

## 2025-05-06 VITALS
SYSTOLIC BLOOD PRESSURE: 112 MMHG | TEMPERATURE: 98 F | OXYGEN SATURATION: 98 % | HEART RATE: 74 BPM | DIASTOLIC BLOOD PRESSURE: 70 MMHG | RESPIRATION RATE: 16 BRPM | WEIGHT: 244 LBS | HEIGHT: 72 IN | BODY MASS INDEX: 33.05 KG/M2

## 2025-05-06 DIAGNOSIS — E11.00 TYPE 2 DIABETES MELLITUS WITH HYPEROSMOLARITY WITHOUT COMA, WITHOUT LONG-TERM CURRENT USE OF INSULIN (HCC): Primary | ICD-10-CM

## 2025-05-06 PROCEDURE — 2022F DILAT RTA XM EVC RTNOPTHY: CPT | Performed by: HOSPITALIST

## 2025-05-06 PROCEDURE — 1123F ACP DISCUSS/DSCN MKR DOCD: CPT | Performed by: HOSPITALIST

## 2025-05-06 PROCEDURE — G8417 CALC BMI ABV UP PARAM F/U: HCPCS | Performed by: HOSPITALIST

## 2025-05-06 PROCEDURE — 3078F DIAST BP <80 MM HG: CPT | Performed by: HOSPITALIST

## 2025-05-06 PROCEDURE — 99213 OFFICE O/P EST LOW 20 MIN: CPT | Performed by: HOSPITALIST

## 2025-05-06 PROCEDURE — 3046F HEMOGLOBIN A1C LEVEL >9.0%: CPT | Performed by: HOSPITALIST

## 2025-05-06 PROCEDURE — G8427 DOCREV CUR MEDS BY ELIG CLIN: HCPCS | Performed by: HOSPITALIST

## 2025-05-06 PROCEDURE — 3017F COLORECTAL CA SCREEN DOC REV: CPT | Performed by: HOSPITALIST

## 2025-05-06 PROCEDURE — 1160F RVW MEDS BY RX/DR IN RCRD: CPT | Performed by: HOSPITALIST

## 2025-05-06 PROCEDURE — 3074F SYST BP LT 130 MM HG: CPT | Performed by: HOSPITALIST

## 2025-05-06 PROCEDURE — 1159F MED LIST DOCD IN RCRD: CPT | Performed by: HOSPITALIST

## 2025-05-06 PROCEDURE — 1036F TOBACCO NON-USER: CPT | Performed by: HOSPITALIST

## 2025-05-06 NOTE — TELEPHONE ENCOUNTER
Stress echo received from [facility].   Attempted to call patient to gather if he plans to follow with ADM and events that led to stress echo. LMOM with return call request.

## 2025-05-06 NOTE — PATIENT INSTRUCTIONS
-Continue metformin 500 mg twice daily    -Continue Jardiance 10 mg daily    - Continue sitagliptin 50 mg daily    -Recommend to check blood sugars 1-2 times daily, fasting, before meals and at bedtime  Please call us if any issues with hypo-/hyperglycemia.    -Follow up in 6 weeks

## 2025-05-06 NOTE — TELEPHONE ENCOUNTER
Patient reports an episode of sharp chest pain, sought care through the ER at [facility]. Completed a stress echo that was faxed to us (media file) and d/c'd home. He does not remember what he was doing at the time when his chest pain started. 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 He also reports a burning sensation between his shoulder blades usually present in the afternoon after he's been working all day. He does not feel like he is in AFib at this time. Uses a pulse ox to check his rate daily.     Requested further documentation from [facility] regarding recent hospital stay [phone]. Request faxed with successful confirmation.

## 2025-05-06 NOTE — PROGRESS NOTES
Ray Sauer is a 74 y.o. male, presenting for follow-up of the following:   Preferred name is Eliel.    Diabetes mellitus type 2: A1c 7.1 % Feb/2025 < 12.3% Oct/2024 < 8.7% April/2024  CT abdomen/pelvis without contrast in May 2022 showed pancreas to be unremarkable.  Date of diagnosis: Oct 2024.    Current regimen: At home on metformin 500 mg twice daily, sitagliptin 50 mg daily and Jardiance 10 mg daily  We stopped glimepiride at his initial visit in March 2025 given older age and CKD    Patient denies any issues with pancreatitis, pancreatic cancer, personal or family history of thyroid cancer, recurrent UTIs or genital infections, amputations, DKA etc.    Intolerance to diabetes medications: Yes  Mounjaro- used it lose weight- caused him to have lower GIB.    Glucose monitoring:   Has a glucometer at home and checks blood sugars occasionally  His fasting have been running in 130s and after meals around 200s    Hypoglycemia: Denies any episodes of hypoglycemia since his last visit  Denies any episodes of falls or loss of consciousness related to hypoglycemia  he does not have glucose tablets at home-advised to get them from pharmacy.    Does patient have Glucagon emergency kit?  Yes      Food intake: 1 meal at night  Eats eggs and jerky early morning  Tries to eat protein mostly    Weight trend: Trying to lose weight, has lost 80 lbs in past year intentionally- tries to watch his diet. Stopped soft drinks and ice-cream    Prior visit with dietician: Yes-saw Johnna in May 2025    Comorbid conditions: Has paroxysmal A-fib on anticoagulation: Follows with EP, NASIMA unable to tolerate CPAP    Diabetic complications: Has CAD status post CABG x 3 in 2012: Follows with cardiology Dr. Bui  Has CKD 3 and Neuropathy in feet  Creatinine 1.55 with GFR 48 in May 2024  Denies CVA, PAD (amputations, foot ulcers)  he denies retinopathy      Eye exam: Had an eye checkup which was done Oct/2024 and he does not have

## 2025-05-07 NOTE — TELEPHONE ENCOUNTER
Records reviewed, patient admitted for chest pain and VIRAL. Trop 0.03 x2, then 0.02. Cr 2.09, improved with hydration to 1.6 prior to d/c. No medication changes made. Stress echo completed outpatient, uploaded to media file.     Yearly f/u w. ADM planned 02/2026. EP follow up scheduled for 10/2025

## 2025-05-09 NOTE — TELEPHONE ENCOUNTER
Per ADM - Schedule follow up to review and see how he is feeling.      I called and spoke to Eliel.  Appointment scheduled for 5/12/25 at [facility] with ADM at 8:15 AM.  He is agreeable to this date/time and location.  Denied any further questions and/or concerns.

## 2025-05-12 ENCOUNTER — RESULTS FOLLOW-UP (OUTPATIENT)
Dept: CARDIOLOGY | Age: 75
End: 2025-05-12

## 2025-05-12 ENCOUNTER — TELEPHONE (OUTPATIENT)
Dept: CARDIOLOGY CLINIC | Age: 75
End: 2025-05-12

## 2025-05-12 ENCOUNTER — OFFICE VISIT (OUTPATIENT)
Dept: CARDIOLOGY CLINIC | Age: 75
End: 2025-05-12
Payer: MEDICARE

## 2025-05-12 ENCOUNTER — HOSPITAL ENCOUNTER (OUTPATIENT)
Age: 75
Discharge: HOME | End: 2025-05-12
Payer: MEDICARE

## 2025-05-12 VITALS
SYSTOLIC BLOOD PRESSURE: 110 MMHG | HEIGHT: 72 IN | HEART RATE: 65 BPM | WEIGHT: 249 LBS | OXYGEN SATURATION: 98 % | BODY MASS INDEX: 33.72 KG/M2 | DIASTOLIC BLOOD PRESSURE: 64 MMHG

## 2025-05-12 DIAGNOSIS — R06.02 SOB (SHORTNESS OF BREATH): ICD-10-CM

## 2025-05-12 DIAGNOSIS — Z95.1 S/P CABG (CORONARY ARTERY BYPASS GRAFT): ICD-10-CM

## 2025-05-12 DIAGNOSIS — I10 HYPERTENSION, UNSPECIFIED TYPE: ICD-10-CM

## 2025-05-12 DIAGNOSIS — I48.0 PAF (PAROXYSMAL ATRIAL FIBRILLATION) (HCC): ICD-10-CM

## 2025-05-12 DIAGNOSIS — R07.9 CHEST PAIN, UNSPECIFIED TYPE: ICD-10-CM

## 2025-05-12 DIAGNOSIS — Z95.1 HISTORY OF CORONARY ARTERY BYPASS GRAFT: ICD-10-CM

## 2025-05-12 DIAGNOSIS — I10 HYPERTENSION, UNSPECIFIED TYPE: Primary | ICD-10-CM

## 2025-05-12 DIAGNOSIS — R06.02 SHORTNESS OF BREATH: ICD-10-CM

## 2025-05-12 DIAGNOSIS — I25.810 CORONARY ARTERY DISEASE INVOLVING CORONARY BYPASS GRAFT OF NATIVE HEART WITHOUT ANGINA PECTORIS: ICD-10-CM

## 2025-05-12 DIAGNOSIS — I25.810 CORONARY ARTERY DISEASE INVOLVING CORONARY BYPASS GRAFT OF NATIVE HEART WITHOUT ANGINA PECTORIS: Primary | ICD-10-CM

## 2025-05-12 LAB
ANION GAP SERPL CALCULATED.3IONS-SCNC: 11 MMOL/L (ref 3–16)
BUN SERPL-MCNC: 40 MG/DL (ref 7–20)
CALCIUM SERPL-MCNC: 9.4 MG/DL (ref 8.3–10.6)
CHLORIDE SERPL-SCNC: 106 MMOL/L (ref 99–110)
CO2 SERPL-SCNC: 25 MMOL/L (ref 21–32)
CREAT SERPL-MCNC: 1.5 MG/DL (ref 0.8–1.3)
DEPRECATED RDW RBC AUTO: 14.6 % (ref 12.4–15.4)
GFR SERPLBLD CREATININE-BSD FMLA CKD-EPI: 49 ML/MIN/{1.73_M2}
GLUCOSE SERPL-MCNC: 221 MG/DL (ref 70–99)
HCT VFR BLD AUTO: 37.3 % (ref 40.5–52.5)
HGB BLD-MCNC: 12.5 G/DL (ref 13.5–17.5)
MCH RBC QN AUTO: 32.4 PG (ref 26–34)
MCHC RBC AUTO-ENTMCNC: 33.6 G/DL (ref 31–36)
MCV RBC AUTO: 96.2 FL (ref 80–100)
PLATELET # BLD AUTO: 169 K/UL (ref 135–450)
PMV BLD AUTO: 8.3 FL (ref 5–10.5)
POTASSIUM SERPL-SCNC: 4 MMOL/L (ref 3.5–5.1)
RBC # BLD AUTO: 3.87 M/UL (ref 4.2–5.9)
SODIUM SERPL-SCNC: 142 MMOL/L (ref 136–145)
WBC # BLD AUTO: 6.6 K/UL (ref 4–11)

## 2025-05-12 PROCEDURE — 3074F SYST BP LT 130 MM HG: CPT | Performed by: INTERNAL MEDICINE

## 2025-05-12 PROCEDURE — 99215 OFFICE O/P EST HI 40 MIN: CPT | Performed by: INTERNAL MEDICINE

## 2025-05-12 PROCEDURE — 80048 BASIC METABOLIC PNL TOTAL CA: CPT

## 2025-05-12 PROCEDURE — 36415 COLL VENOUS BLD VENIPUNCTURE: CPT

## 2025-05-12 PROCEDURE — 3078F DIAST BP <80 MM HG: CPT | Performed by: INTERNAL MEDICINE

## 2025-05-12 PROCEDURE — 1123F ACP DISCUSS/DSCN MKR DOCD: CPT | Performed by: INTERNAL MEDICINE

## 2025-05-12 PROCEDURE — G8427 DOCREV CUR MEDS BY ELIG CLIN: HCPCS | Performed by: INTERNAL MEDICINE

## 2025-05-12 PROCEDURE — 1159F MED LIST DOCD IN RCRD: CPT | Performed by: INTERNAL MEDICINE

## 2025-05-12 PROCEDURE — 85027 COMPLETE CBC AUTOMATED: CPT

## 2025-05-12 PROCEDURE — 1036F TOBACCO NON-USER: CPT | Performed by: INTERNAL MEDICINE

## 2025-05-12 PROCEDURE — G2211 COMPLEX E/M VISIT ADD ON: HCPCS | Performed by: INTERNAL MEDICINE

## 2025-05-12 PROCEDURE — 3017F COLORECTAL CA SCREEN DOC REV: CPT | Performed by: INTERNAL MEDICINE

## 2025-05-12 PROCEDURE — G8417 CALC BMI ABV UP PARAM F/U: HCPCS | Performed by: INTERNAL MEDICINE

## 2025-05-12 NOTE — PROGRESS NOTES
patient/family. Alternatives to my treatment were discussed. The note was completed using EMR. Every effort was made to ensure accuracy; however, inadvertent computerized transcription errors may be present.  Lj Bui MD 5/12/2025 12:16 PM

## 2025-05-12 NOTE — TELEPHONE ENCOUNTER
Per ADM stop lisinopril/Hctz, drink at least 64oz of water daily. Order placed to have renal panel completed prior to [facility] on 5/21. Would recommend completing needed lab work at [facility] on 5/19 in the morning.      Attempted to call patient to relay above message. Lmom with return call requested.

## 2025-05-12 NOTE — TELEPHONE ENCOUNTER
Date of Procedure: Wednesday 5/21/25 @ [facility] with Dr. Taylor     Time of arrival: 9:00 am     Procedure time: 10:30 am     Spoke to Eliel and he is agreeable to date and time. Reviewed and sent Eliel a [employer] message with his procedure date, time and instructions and he verbalized understanding. Encouraged to call with any questions or concerns.     Published on [employer], scheduled on [employer] and e-mailed to cath lab.

## 2025-05-12 NOTE — PATIENT INSTRUCTIONS
- Have your labs drawn today    Left Heart Cath Patient Pre-procedure Instructions    Do NOT eat or drink anything after midnight morning of procedure    MEDICATIONS:  Your medications have been reviewed. Please follow medication instructions below  It is okay to drink a sip of water with meds morning of procedure  Eliquis-do not take for 48 hours (2 days) prior to procedure  Diuretics- Hold diuretics (water pill) for comfort morning of procedure. Your diuretics to hold: Hydrochlorothiazide  Metformin/Insulin-Consider holding the night before and morning of procedure if your sugars might run low because of not eating any breakfast.    Transportation: Bring someone to drive you home.     Scheduling: Elizabeth GANNON is our full time procedure . She will call you to schedule your procedure.    Allergies: Do you have an allergy to dye or contrast? No.    Labs: We need to check blood work within 30 days of your procedure (CBC & BMP). Please go to any [facility] lab to get your labs drawn prior to your procedure.

## 2025-05-12 NOTE — TELEPHONE ENCOUNTER
Elizabeth, can you please schedule patient for [facility] with next available? Instructions reviewed below. Thank you!    Left Heart Cath Patient Pre-procedure Instructions     Do NOT eat or drink anything after midnight morning of procedure     MEDICATIONS:  Your medications have been reviewed. Please follow medication instructions below  It is okay to drink a sip of water with meds morning of procedure  Eliquis-do not take for 48 hours (2 days) prior to procedure  Diuretics- Hold diuretics (water pill) for comfort morning of procedure. Your diuretics to hold: Hydrochlorothiazide  Metformin/Insulin-Consider holding the night before and morning of procedure if your sugars might run low because of not eating any breakfast.     Transportation: Bring someone to drive you home.      Scheduling: Elizabeth GANNON is our full time procedure . She will call you to schedule your procedure.     Allergies: Do you have an allergy to dye or contrast? No.     Labs: We need to check blood work within 30 days of your procedure (CBC & BMP). Please go to any [facility] lab to get your labs drawn prior to your procedure.

## 2025-05-12 NOTE — TELEPHONE ENCOUNTER
----- Message from Dr. Lj Bui MD sent at 5/12/2025  4:01 PM EDT -----  Stop lisinopril/HCTZ, drink at least 64oz of water daily. Repeat labs prior to [facility]

## 2025-05-13 ENCOUNTER — TELEPHONE (OUTPATIENT)
Dept: CARDIOLOGY CLINIC | Age: 75
End: 2025-05-13

## 2025-05-13 NOTE — TELEPHONE ENCOUNTER
Pt called to request his labs be sent to [facility]:      [facility] [place], [place]  Phone:   [phone]  Main #    Fax:   [phone]    Thank you

## 2025-05-13 NOTE — TELEPHONE ENCOUNTER
I spoke with pt and relayed message. Per MIRLANDE and he requested lab order be faxed to [facility] in [facility]  I faxed to [phone], receipt confirmed

## 2025-05-21 ENCOUNTER — HOSPITAL ENCOUNTER (OUTPATIENT)
Age: 75
Setting detail: OUTPATIENT SURGERY
Discharge: HOME | End: 2025-05-21
Attending: INTERNAL MEDICINE | Admitting: INTERNAL MEDICINE
Payer: MEDICARE

## 2025-05-21 VITALS
OXYGEN SATURATION: 98 % | SYSTOLIC BLOOD PRESSURE: 113 MMHG | BODY MASS INDEX: 32.1 KG/M2 | TEMPERATURE: 98.1 F | RESPIRATION RATE: 12 BRPM | HEART RATE: 51 BPM | WEIGHT: 237 LBS | DIASTOLIC BLOOD PRESSURE: 73 MMHG | HEIGHT: 72 IN

## 2025-05-21 DIAGNOSIS — R06.02 SOB (SHORTNESS OF BREATH): ICD-10-CM

## 2025-05-21 DIAGNOSIS — Z95.1 S/P CABG (CORONARY ARTERY BYPASS GRAFT): ICD-10-CM

## 2025-05-21 DIAGNOSIS — R07.9 CHEST PAIN: ICD-10-CM

## 2025-05-21 LAB
EKG ATRIAL RATE: 57 BPM
EKG DIAGNOSIS: NORMAL
EKG P AXIS: 70 DEGREES
EKG P-R INTERVAL: 208 MS
EKG Q-T INTERVAL: 414 MS
EKG QRS DURATION: 108 MS
EKG QTC CALCULATION (BAZETT): 402 MS
EKG R AXIS: -18 DEGREES
EKG T AXIS: 54 DEGREES
EKG VENTRICULAR RATE: 57 BPM

## 2025-05-21 PROCEDURE — 7100000011 HC PHASE II RECOVERY - ADDTL 15 MIN: Performed by: INTERNAL MEDICINE

## 2025-05-21 PROCEDURE — 6360000002 HC RX W HCPCS: Performed by: INTERNAL MEDICINE

## 2025-05-21 PROCEDURE — 93005 ELECTROCARDIOGRAM TRACING: CPT

## 2025-05-21 PROCEDURE — 93459 L HRT ART/GRFT ANGIO: CPT | Performed by: INTERNAL MEDICINE

## 2025-05-21 PROCEDURE — C1894 INTRO/SHEATH, NON-LASER: HCPCS | Performed by: INTERNAL MEDICINE

## 2025-05-21 PROCEDURE — 99153 MOD SED SAME PHYS/QHP EA: CPT | Performed by: INTERNAL MEDICINE

## 2025-05-21 PROCEDURE — 99152 MOD SED SAME PHYS/QHP 5/>YRS: CPT | Performed by: INTERNAL MEDICINE

## 2025-05-21 PROCEDURE — 7100000010 HC PHASE II RECOVERY - FIRST 15 MIN: Performed by: INTERNAL MEDICINE

## 2025-05-21 PROCEDURE — C1760 CLOSURE DEV, VASC: HCPCS | Performed by: INTERNAL MEDICINE

## 2025-05-21 PROCEDURE — C1769 GUIDE WIRE: HCPCS | Performed by: INTERNAL MEDICINE

## 2025-05-21 PROCEDURE — 2709999900 HC NON-CHARGEABLE SUPPLY: Performed by: INTERNAL MEDICINE

## 2025-05-21 PROCEDURE — 6360000004 HC RX CONTRAST MEDICATION: Performed by: INTERNAL MEDICINE

## 2025-05-21 RX ORDER — SODIUM CHLORIDE 0.9 % (FLUSH) 0.9 %
5-40 SYRINGE (ML) INJECTION PRN
Status: DISCONTINUED | OUTPATIENT
Start: 2025-05-21 | End: 2025-05-21 | Stop reason: HOSPADM

## 2025-05-21 RX ORDER — IOPAMIDOL 755 MG/ML
INJECTION, SOLUTION INTRAVASCULAR PRN
Status: DISCONTINUED | OUTPATIENT
Start: 2025-05-21 | End: 2025-05-21 | Stop reason: HOSPADM

## 2025-05-21 RX ORDER — SODIUM CHLORIDE 9 MG/ML
INJECTION, SOLUTION INTRAVENOUS PRN
Status: DISCONTINUED | OUTPATIENT
Start: 2025-05-21 | End: 2025-05-21 | Stop reason: HOSPADM

## 2025-05-21 RX ORDER — LIDOCAINE HYDROCHLORIDE 10 MG/ML
INJECTION, SOLUTION INFILTRATION; PERINEURAL PRN
Status: DISCONTINUED | OUTPATIENT
Start: 2025-05-21 | End: 2025-05-21 | Stop reason: HOSPADM

## 2025-05-21 RX ORDER — SODIUM CHLORIDE 0.9 % (FLUSH) 0.9 %
5-40 SYRINGE (ML) INJECTION EVERY 12 HOURS SCHEDULED
Status: DISCONTINUED | OUTPATIENT
Start: 2025-05-21 | End: 2025-05-21 | Stop reason: HOSPADM

## 2025-05-21 RX ORDER — MIDAZOLAM HYDROCHLORIDE 1 MG/ML
INJECTION, SOLUTION INTRAMUSCULAR; INTRAVENOUS PRN
Status: DISCONTINUED | OUTPATIENT
Start: 2025-05-21 | End: 2025-05-21 | Stop reason: HOSPADM

## 2025-05-21 NOTE — PROGRESS NOTES
Pt out of bed and ambulated to chair.  IV d/c'd and pt discharged home with daughter driving.  
Pt returned to holding area from procedure room, daughter present.  
      I had the opportunity to review the clinical symptoms and presentation of Ray Sauer.     Active Problems:    Chest pain  SOB  CAD S/P CABG X3  1/17/12 LIMA-LAD, SVG-OM2, PDA/Lcx  HTN  - LVEF 50-55%  - negative nuc stress 2022 at OSH  - Stress echo, did not achieve THR/impaired exercise capacity  - Continue Asa 81, Crestor 40, Lisinopril/hctz 20-12.5, Lopressor 25 BID  - C ordered, discussed at length, CBC and BMP today    pAF  - S/p RFCA 2022 and DCCV 2024  - Eliquis 5 BID, Digoxin 125, diltiazem 120 BID, lopressor 25 BID  - Follows with EP    DMII  - A1C 7.1%  - Follows with Endocrinology    Follow up: 3 month s/p C    Scribe's attestation: This note was scribed in the presence of Dr. Hao MD, by Анна Driver RN.    All questions and concerns were addressed to the patient/family. Alternatives to my treatment were discussed. The note was completed using EMR. Every effort was made to ensure accuracy; however, inadvertent computerized transcription errors may be present.  Lj Bui MD 5/21/2025 9:49 AM    I have reviewed the history and physical and examined the patient and find no relevant changes in the history and physical exam, including heart and lung sounds  I have reviewed with the patient and/or family the risks, benefits, and alternatives to the procedure.    Based on these findings I recommend left heart cath for definitive evaluation of coronary arteries.  Risks, benefits, expectations, and alternative treatments were discussed.  Questions appropriately answered.  Ray Sauer agrees to proceed and verbalized understanding.       Colt Taylor MD 5/21/2025 9:49 AM

## 2025-05-21 NOTE — DISCHARGE INSTRUCTIONS
LEFT HEART CATHETERIZATION    Care of your puncture site:  Remove bandage 24 hours after the procedure.  May shower in 24 hours but do not sit in a bathtub/pool of water for 5 days or until the wound is healed.  Inspect the site daily and gently clean using soap and water while standing in the shower.  Dry thoroughly and apply a Band-Aid that covers the entire site. Do not apply powder or lotion.    Normal Observations:  Soreness or tenderness which may last one week.  Mild oozing from the incision site.  Possible bruising that could last 2 weeks.    Activity:  You may resume driving 24 hours following the procedure.  You may resume normal activity in 5 days or after the wound heals.  Avoid lifting more than 10 pounds for 5 days or until the wound heals.  Avoid strenuous exercise or activity for 1 week.  You may return to work in 2 days with above restrictions.    Nutrition:  Regular diet.  Drink at least 8 to 10 glasses of decaffeinated, non-alcoholic fluid for the next 24 hours to flush the x-ray dye used for your angiogram out of your body.    Call your doctor immediately if your condition worsens, for any other concerns, for a follow-up appointment or if you experience any of the following:  Significant bleeding that does not stop after 10 minutes of applying firm pressure on the puncture site.  Increased swelling on the groin or leg.  Unusual pain, numbness, or tingling of the groin or down the leg.  Any signs of infection such as: redness, yellow drainage at the site, swelling or pain.    Other Instructions:  Hold Metformin or Metformin containing drugs for 48 hours after procedure.

## 2025-05-21 NOTE — H&P
[facility]  Cardiology Note  [phone]      No chief complaint on file.     History of Present Illness:  Ray Sauer is a 74 y.o. patient who presents to our office for cardiac evaluation. His history includes CAD/CABG, HTN, HLD, PAF. He is a former smoker. He has seen Dr. Bennett in the past.    He was cardioverted 1/11/24 (Dr. Hyde).    02/01/24 OV w/ me: Today, he states that overall he feels fairly well. He has more energy since his cardioversion.  He c/o mild SOB at times, not sure when it occurs exactly. denies exertional chest pain, PND, palpitations, light-headedness, edema. He was changed from atorvastatin to rosuvastatin for a questionable rash but he believes there was another causative factor. He is \"pretty active\" and will stop to rest if he needs to. He doers have some hand cramps at times. 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     02/18/25 OV: 1 year follow up. He believes he's been in afib for the last 4-5 days. Confirmed by EKG today.    He has been struggling with hyperglycemia, 300-500's. He's been very cautious about what he has been eating, currently on metformin and glimepiride. He's lost about 60 lbs. Has not seen an endocrinologist.     He complains of cold feet and dry cracked heels, does not follow with podiatrist.     Patient ate breakfast at 8 am today. I consulted with nayeli Espinoza to keep NPO for 4 hours prior to cardioversion. Patient reports he has taken all doses of his medications, including Eliquis.     02/18/25: successful DCCV of atrial fibrillation with Dr. Gayle.    On 4/25/25 patient sought care at [facility] ED due to complaints of sharp chest pain. He was admitted for CP and VIRAL. Trop 0.03 x2, then 0.02. Cr 2.09, improved with hydration to 1.6 prior to d/c. No medication changes made. Stress echo completed outpatient. Spoke with patient post

## 2025-05-21 NOTE — PRE SEDATION
infusion   IntraVENous Colt Verduzco MD           Past Medical History:    Past Medical History:   Diagnosis Date    CAD (coronary artery disease)     S/P CABG X3  1/17/12 LIMA-LAD, SVG-OM2 and PDA/LCx, LVEF 50-55%    COPD (chronic obstructive pulmonary disease) ([facility])     managed by PCP    Hyperlipidemia     rec semi annual lipids with LDL goal <70    Hypertension     controlled    Tobacco user     cessation discussed    Type 2 diabetes mellitus with hyperosmolarity without coma, without long-term current use of insulin ([facility]) 03/25/2025       Surgical History:    Past Surgical History:   Procedure Laterality Date    CORONARY ARTERY BYPASS GRAFT  1/11/2012    KNEE SURGERY               Pre-Sedation:  Pre-Sedation Documentation and Exam:  I have assessed the patient and reviewed the H&P on the chart.    Prior History of Anesthesia Complications:   none    Modified Mallampati:  II (soft palate, uvula, fauces visible)    ASA Classification:  Class 2 - A normal healthy patient with mild systemic disease    Hope Scale:  Activity:  2 - Able to move 4 extremities voluntarily on command  Respiration:  2 - Able to breathe deeply and cough freely  Circulation:  2 - BP+/- 20mmHg of normal  Consciousness:  2 - Fully awake  Oxygen Saturation (color):  2 - Able to maintain oxygen saturation >92% on room air    Sedation/Anesthesia Plan:  Guard the patient's safety and welfare.  Minimize physical discomfort and pain.  Minimize negative psychological responses to treatment by providing sedation and analgesia and maximize the potential amnesia.  Patient to meet pre-procedure discharge plan.    Medication Planned:  midazolam intravenously and fentanyl intravenously    Patient is an appropriate candidate for plan of sedation:   yes      Electronically signed by Colt Taylor MD on 5/21/2025 at 9:50 AM

## 2025-05-22 LAB — ECHO BSA: 2.4 M2

## 2025-05-27 ENCOUNTER — TELEPHONE (OUTPATIENT)
Dept: CARDIOLOGY CLINIC | Age: 75
End: 2025-05-27

## 2025-05-27 NOTE — TELEPHONE ENCOUNTER
Pt called to inform the office that when taking Eliquis he bleeds on his arm when he bump it, both arms presently he has a bandage on it.  Pt is wanting to know if he could stop taking it.  Please call to discuss this matter.  Thank you

## 2025-05-27 NOTE — TELEPHONE ENCOUNTER
No. He must continue Eliquis 5 mg BID due to risk of recurrent afib and stroke. He can see Dr. Peraza in next few months to discuss watchman implantation if bleeding issues persist    NEMESIO Washington-CNP

## 2025-06-18 ENCOUNTER — OFFICE VISIT (OUTPATIENT)
Age: 75
End: 2025-06-18
Payer: MEDICARE

## 2025-06-18 VITALS
HEIGHT: 72 IN | BODY MASS INDEX: 33.32 KG/M2 | DIASTOLIC BLOOD PRESSURE: 80 MMHG | HEART RATE: 67 BPM | OXYGEN SATURATION: 98 % | WEIGHT: 246 LBS | TEMPERATURE: 98 F | RESPIRATION RATE: 16 BRPM | SYSTOLIC BLOOD PRESSURE: 136 MMHG

## 2025-06-18 DIAGNOSIS — E11.00 TYPE 2 DIABETES MELLITUS WITH HYPEROSMOLARITY WITHOUT COMA, WITHOUT LONG-TERM CURRENT USE OF INSULIN (HCC): Primary | ICD-10-CM

## 2025-06-18 DIAGNOSIS — M89.9 DISORDER OF BONE, UNSPECIFIED: ICD-10-CM

## 2025-06-18 DIAGNOSIS — E11.00 TYPE 2 DIABETES MELLITUS WITH HYPEROSMOLARITY WITHOUT COMA, WITHOUT LONG-TERM CURRENT USE OF INSULIN (HCC): ICD-10-CM

## 2025-06-18 LAB
25(OH)D3 SERPL-MCNC: 18.3 NG/ML
CK SERPL-CCNC: 125 U/L (ref 39–308)
HBA1C MFR BLD: 6.6 %
TSH SERPL DL<=0.005 MIU/L-ACNC: 0.96 UIU/ML (ref 0.27–4.2)

## 2025-06-18 PROCEDURE — G8427 DOCREV CUR MEDS BY ELIG CLIN: HCPCS | Performed by: HOSPITALIST

## 2025-06-18 PROCEDURE — 1123F ACP DISCUSS/DSCN MKR DOCD: CPT | Performed by: HOSPITALIST

## 2025-06-18 PROCEDURE — 1036F TOBACCO NON-USER: CPT | Performed by: HOSPITALIST

## 2025-06-18 PROCEDURE — 3075F SYST BP GE 130 - 139MM HG: CPT | Performed by: HOSPITALIST

## 2025-06-18 PROCEDURE — 3044F HG A1C LEVEL LT 7.0%: CPT | Performed by: HOSPITALIST

## 2025-06-18 PROCEDURE — G8417 CALC BMI ABV UP PARAM F/U: HCPCS | Performed by: HOSPITALIST

## 2025-06-18 PROCEDURE — 1159F MED LIST DOCD IN RCRD: CPT | Performed by: HOSPITALIST

## 2025-06-18 PROCEDURE — 99214 OFFICE O/P EST MOD 30 MIN: CPT | Performed by: HOSPITALIST

## 2025-06-18 PROCEDURE — 3017F COLORECTAL CA SCREEN DOC REV: CPT | Performed by: HOSPITALIST

## 2025-06-18 PROCEDURE — 2022F DILAT RTA XM EVC RTNOPTHY: CPT | Performed by: HOSPITALIST

## 2025-06-18 PROCEDURE — 3079F DIAST BP 80-89 MM HG: CPT | Performed by: HOSPITALIST

## 2025-06-18 PROCEDURE — 1160F RVW MEDS BY RX/DR IN RCRD: CPT | Performed by: HOSPITALIST

## 2025-06-18 PROCEDURE — 83036 HEMOGLOBIN GLYCOSYLATED A1C: CPT | Performed by: HOSPITALIST

## 2025-06-18 NOTE — ASSESSMENT & PLAN NOTE
Orders:    SITagliptin (JANUVIA) 50 MG tablet; Take 1 tablet by mouth daily    metFORMIN (GLUCOPHAGE) 500 MG tablet; Take 1 tablet by mouth 2 times daily (with meals)    POCT glycosylated hemoglobin (Hb A1C)

## 2025-06-18 NOTE — PROGRESS NOTES
Ray Sauer is a 74 y.o. male, presenting for follow-up of the following:   Preferred name is Eliel.    Diabetes mellitus type 2: A1c 6.6% June/2025 < 7.1 % Feb/2025 < 12.3% Oct/2024 < 8.7% April/2024  CT abdomen/pelvis without contrast in May 2022 showed pancreas to be unremarkable.  Date of diagnosis: Oct 2024.    Current regimen: At home on metformin 500 mg twice daily, sitagliptin 50 mg daily and Jardiance 10 mg daily  We stopped glimepiride at his initial visit in March 2025 given older age and CKD    Patient denies any issues with pancreatitis, pancreatic cancer, personal or family history of thyroid cancer, recurrent UTIs or genital infections, amputations, DKA etc.    Intolerance to diabetes medications: Yes  Mounjaro- used it lose weight- caused him to have lower GIB.    Glucose monitoring:   Has a glucometer at home and checks blood sugars occasionally  His fasting have been running in 120-130s     Hypoglycemia: Denies any episodes of hypoglycemia since his last visit  Denies any episodes of falls or loss of consciousness related to hypoglycemia  he does not have glucose tablets at home-advised to get them from pharmacy.    Does patient have Glucagon emergency kit?  Yes      Food intake: 1 meal at night  Eats eggs and jerky early morning  Tries to eat protein mostly    Weight trend: Trying to lose weight, has lost 80 lbs in past year intentionally- tries to watch his diet. Stopped soft drinks and ice-cream    Prior visit with dietician: Yes-saw Johnna in May 2025    Comorbid conditions: Has paroxysmal A-fib on anticoagulation: Follows with EP, NASIMA unable to tolerate CPAP    Diabetic complications: Has CAD status post CABG x 3 in 2012: Follows with cardiology Dr. Bui  Has CKD 3 and Neuropathy in feet  Creatinine 1.5 with GFR 49 in May 2025  Denies CVA, PAD (amputations, foot ulcers)  he denies retinopathy      Eye exam: Had an eye checkup which was done Oct/2024 and he does not have

## 2025-06-18 NOTE — PATIENT INSTRUCTIONS
-Continue metformin 500 mg twice daily    -Continue Jardiance 10 mg daily    - Continue sitagliptin 50 mg daily    -Recommend to check blood sugars 1-2 times daily, fasting, before meals and at bedtime  Please call us if any issues with hypo-/hyperglycemia.    -Follow up in 3 months

## 2025-06-18 NOTE — ADDENDUM NOTE
Addended by: OTTONIEL CUNNINGHAM on: 6/18/2025 10:57 AM     Modules accepted: Orders, Level of Service

## 2025-06-20 ENCOUNTER — RESULTS FOLLOW-UP (OUTPATIENT)
Dept: ENDOCRINOLOGY | Age: 75
End: 2025-06-20

## 2025-06-24 DIAGNOSIS — E11.00 TYPE 2 DIABETES MELLITUS WITH HYPEROSMOLARITY WITHOUT COMA, WITHOUT LONG-TERM CURRENT USE OF INSULIN (HCC): Primary | ICD-10-CM

## 2025-06-24 RX ORDER — EMPAGLIFLOZIN 10 MG/1
10 TABLET, FILM COATED ORAL DAILY
Qty: 90 TABLET | Refills: 1 | Status: SHIPPED | OUTPATIENT
Start: 2025-06-24

## 2025-06-24 NOTE — TELEPHONE ENCOUNTER
Medication:   Requested Prescriptions     Pending Prescriptions Disp Refills    JARDIANCE 10 MG tablet [Pharmacy Med Name: JARDIANCE 10 MG TABLET] 90 tablet 1     Sig: TAKE 1 TABLET BY MOUTH EVERY DAY       Last Filled:      Patient Phone Number: [phone] (home)     Last appt: 6/18/2025   Next appt: 9/23/2025    Last Labs DM:   Lab Results   Component Value Date/Time    LABA1C 6.6 06/18/2025 10:49 AM

## 2025-08-18 ENCOUNTER — OFFICE VISIT (OUTPATIENT)
Dept: CARDIOLOGY CLINIC | Age: 75
End: 2025-08-18
Payer: MEDICARE

## 2025-08-18 VITALS
HEART RATE: 60 BPM | HEIGHT: 72 IN | DIASTOLIC BLOOD PRESSURE: 70 MMHG | SYSTOLIC BLOOD PRESSURE: 112 MMHG | BODY MASS INDEX: 30.88 KG/M2 | OXYGEN SATURATION: 98 % | WEIGHT: 228 LBS

## 2025-08-18 DIAGNOSIS — E78.49 OTHER HYPERLIPIDEMIA: Primary | ICD-10-CM

## 2025-08-18 DIAGNOSIS — Z78.9 STATIN INTOLERANCE: ICD-10-CM

## 2025-08-18 DIAGNOSIS — E11.00 TYPE 2 DIABETES MELLITUS WITH HYPEROSMOLARITY WITHOUT COMA, WITHOUT LONG-TERM CURRENT USE OF INSULIN (HCC): ICD-10-CM

## 2025-08-18 DIAGNOSIS — Z95.1 S/P CABG (CORONARY ARTERY BYPASS GRAFT): ICD-10-CM

## 2025-08-18 DIAGNOSIS — I48.0 PAF (PAROXYSMAL ATRIAL FIBRILLATION) (HCC): ICD-10-CM

## 2025-08-18 DIAGNOSIS — I25.10 CORONARY ARTERY DISEASE INVOLVING NATIVE CORONARY ARTERY OF NATIVE HEART WITHOUT ANGINA PECTORIS: ICD-10-CM

## 2025-08-18 PROCEDURE — 2022F DILAT RTA XM EVC RTNOPTHY: CPT | Performed by: INTERNAL MEDICINE

## 2025-08-18 PROCEDURE — 1036F TOBACCO NON-USER: CPT | Performed by: INTERNAL MEDICINE

## 2025-08-18 PROCEDURE — 1159F MED LIST DOCD IN RCRD: CPT | Performed by: INTERNAL MEDICINE

## 2025-08-18 PROCEDURE — G8417 CALC BMI ABV UP PARAM F/U: HCPCS | Performed by: INTERNAL MEDICINE

## 2025-08-18 PROCEDURE — G8427 DOCREV CUR MEDS BY ELIG CLIN: HCPCS | Performed by: INTERNAL MEDICINE

## 2025-08-18 PROCEDURE — 3017F COLORECTAL CA SCREEN DOC REV: CPT | Performed by: INTERNAL MEDICINE

## 2025-08-18 PROCEDURE — G2211 COMPLEX E/M VISIT ADD ON: HCPCS | Performed by: INTERNAL MEDICINE

## 2025-08-18 PROCEDURE — 3044F HG A1C LEVEL LT 7.0%: CPT | Performed by: INTERNAL MEDICINE

## 2025-08-18 PROCEDURE — 3074F SYST BP LT 130 MM HG: CPT | Performed by: INTERNAL MEDICINE

## 2025-08-18 PROCEDURE — 1123F ACP DISCUSS/DSCN MKR DOCD: CPT | Performed by: INTERNAL MEDICINE

## 2025-08-18 PROCEDURE — 3078F DIAST BP <80 MM HG: CPT | Performed by: INTERNAL MEDICINE

## 2025-08-18 PROCEDURE — 99214 OFFICE O/P EST MOD 30 MIN: CPT | Performed by: INTERNAL MEDICINE

## 2025-08-19 ENCOUNTER — TELEPHONE (OUTPATIENT)
Dept: ADMINISTRATIVE | Age: 75
End: 2025-08-19

## 2025-08-25 ENCOUNTER — TELEPHONE (OUTPATIENT)
Dept: CARDIOLOGY CLINIC | Age: 75
End: 2025-08-25

## 2025-09-02 RX ORDER — DIGOXIN 125 MCG
125 TABLET ORAL DAILY
Qty: 90 TABLET | Refills: 1 | Status: SHIPPED | OUTPATIENT
Start: 2025-09-02